# Patient Record
Sex: FEMALE | Race: WHITE | Employment: FULL TIME | URBAN - METROPOLITAN AREA
[De-identification: names, ages, dates, MRNs, and addresses within clinical notes are randomized per-mention and may not be internally consistent; named-entity substitution may affect disease eponyms.]

---

## 2020-02-21 ENCOUNTER — HOSPITAL ENCOUNTER (EMERGENCY)
Age: 60
Discharge: HOME | End: 2020-02-21
Attending: EMERGENCY MEDICINE | Admitting: EMERGENCY MEDICINE
Payer: OTHER MISCELLANEOUS

## 2020-02-21 VITALS
SYSTOLIC BLOOD PRESSURE: 143 MMHG | TEMPERATURE: 98.1 F | HEART RATE: 60 BPM | DIASTOLIC BLOOD PRESSURE: 85 MMHG | OXYGEN SATURATION: 97 % | RESPIRATION RATE: 18 BRPM

## 2020-02-21 DIAGNOSIS — S62.101A CLOSED FRACTURE OF RIGHT WRIST, INITIAL ENCOUNTER: Primary | ICD-10-CM

## 2020-02-21 PROCEDURE — 96372 THER/PROPH/DIAG INJ SC/IM: CPT

## 2020-02-21 PROCEDURE — 74011000250 HC RX REV CODE- 250: Performed by: PHYSICIAN ASSISTANT

## 2020-02-21 PROCEDURE — 96374 THER/PROPH/DIAG INJ IV PUSH: CPT

## 2020-02-21 PROCEDURE — 75810000303 HC CLSD TRMT  FRACTURE/DISLOCATION W/  ANES

## 2020-02-21 PROCEDURE — 99283 EMERGENCY DEPT VISIT LOW MDM: CPT

## 2020-02-21 PROCEDURE — 74011250636 HC RX REV CODE- 250/636: Performed by: PHYSICIAN ASSISTANT

## 2020-02-21 RX ORDER — HYDROCODONE BITARTRATE AND ACETAMINOPHEN 5; 325 MG/1; MG/1
1 TABLET ORAL
Qty: 12 TAB | Refills: 0 | Status: SHIPPED | OUTPATIENT
Start: 2020-02-21 | End: 2020-02-24

## 2020-02-21 RX ORDER — HYDROMORPHONE HYDROCHLORIDE 1 MG/ML
0.5 INJECTION, SOLUTION INTRAMUSCULAR; INTRAVENOUS; SUBCUTANEOUS ONCE
Status: COMPLETED | OUTPATIENT
Start: 2020-02-21 | End: 2020-02-21

## 2020-02-21 RX ORDER — BUPIVACAINE HYDROCHLORIDE 5 MG/ML
15 INJECTION, SOLUTION EPIDURAL; INTRACAUDAL ONCE
Status: COMPLETED | OUTPATIENT
Start: 2020-02-21 | End: 2020-02-21

## 2020-02-21 RX ADMIN — HYDROMORPHONE HYDROCHLORIDE 0.5 MG: 1 INJECTION, SOLUTION INTRAMUSCULAR; INTRAVENOUS; SUBCUTANEOUS at 19:42

## 2020-02-21 RX ADMIN — BUPIVACAINE HYDROCHLORIDE 75 MG: 5 INJECTION, SOLUTION EPIDURAL; INTRACAUDAL; PERINEURAL at 19:19

## 2020-02-21 NOTE — ED TRIAGE NOTES
From [facility] with a right wrist fracture. She says they spoke with an Orthopaedic surgeon. She was to come here to see him to have it \"set\". She has a splint and sling.

## 2020-02-21 NOTE — ED PROVIDER NOTES
63year old female, R handed, presenting for RIGHT wrist pain. Pt notes that earlier this afternoon she was coming around the corner at work and almost collided with someone, notes that she attempted to back up, lost her balance and fell, landing on her buttocks and right wrist.  Had acute onset of moderately severe pain in the right wrist, worse with movement or palpation. No head trauma or LOC. Pt was sent from [facility].      PMHx: HTN  Social: no tobacco, alcohol, drug use -rare glass of wine  Allergies: codeine, sulfa, MRI dye           Past Medical History:   Diagnosis Date    AR (allergic rhinitis) 8/23/2010    Arthritis     FINGERS    Chronic pain     LOWER BACK    Diverticulitis     Hemorrhoids     Migraine 8/23/2010    Nasal fracture     Sleep apnea     Unspecified sleep apnea     C-PAP       Past Surgical History:   Procedure Laterality Date    HX DILATION AND CURETTAGE      SEVERAL TIMES    HX GI      COLONOSCOPY, POLYPS    HX HERNIA REPAIR      RT. INGUINAL    HX ORTHOPAEDIC      right knee surgery    HX OTHER SURGICAL      CORTISONE INJECTIONS IN DANYELL. KNEES    [place] BIOPSY OF BREAST, NEEDLE CORE  1994    RT. BENIGN         Family History:   Problem Relation Age of Onset    Cancer Mother         BREAST     Cancer Father         MESOTHELIOMA       Social History     Socioeconomic History    Marital status:      Spouse name: Not on file    Number of children: Not on file    Years of education: Not on file    Highest education level: Not on file   Occupational History    Not on file   Social Needs    Financial resource strain: Not on file    Food insecurity:     Worry: Not on file     Inability: Not on file    Transportation needs:     Medical: Not on file     Non-medical: Not on file   Tobacco Use    Smoking status: Never Smoker    Smokeless tobacco: Never Used   Substance and Sexual Activity    Alcohol use:  Yes     Alcohol/week: 0.8 standard drinks     Types: 1 Glasses of wine per week    Drug use: No    Sexual activity: Not on file   Lifestyle    Physical activity:     Days per week: Not on file     Minutes per session: Not on file    Stress: Not on file   Relationships    Social connections:     Talks on phone: Not on file     Gets together: Not on file     Attends Mandaen service: Not on file     Active member of club or organization: Not on file     Attends meetings of clubs or organizations: Not on file     Relationship status: Not on file    Intimate partner violence:     Fear of current or ex partner: Not on file     Emotionally abused: Not on file     Physically abused: Not on file     Forced sexual activity: Not on file   Other Topics Concern    Not on file   Social History Narrative    Not on file         ALLERGIES: Codeine and Sulfa (sulfonamide antibiotics)    Review of Systems   Constitutional: Negative for fever. HENT: Negative for facial swelling. Respiratory: Negative for shortness of breath. Cardiovascular: Negative for chest pain. Gastrointestinal: Negative for vomiting. Musculoskeletal: Positive for arthralgias and joint swelling. Skin: Negative for wound. Neurological: Negative for syncope. All other systems reviewed and are negative. Vitals:    02/21/20 1721   BP: 143/85   Pulse: 60   Resp: 18   Temp: 98.1 °F (36.7 °C)   SpO2: 97%            Physical Exam  Vitals signs and nursing note reviewed. Constitutional:       Appearance: She is well-developed. Comments: Pleasant white female   HENT:      Head: Normocephalic. Eyes:      Conjunctiva/sclera: Conjunctivae normal.   Neck:      Musculoskeletal: Neck supple. Cardiovascular:      Rate and Rhythm: Normal rate. Pulmonary:      Effort: Pulmonary effort is normal. No respiratory distress. Musculoskeletal:      Comments: Right arm and volar splint, cap refill and sensation intact   Skin:     General: Skin is warm and dry.    Neurological:      Mental Status: She is alert and oriented to person, place, and time. MDM  Number of Diagnoses or Management Options  Diagnosis management comments:  52-year-old right-handed female presenting to the ED for right impacted distal radius fracture after mechanical fall earlier today, seen by Ortho for reduction in the ED, splinted, given orthopedic follow-up. Amount and/or Complexity of Data Reviewed  Tests in the radiology section of CPT®: ordered and reviewed  Discuss the patient with other providers: yes (Dr. Willie Rivera, ED attending.   Dr. Millie Abdalla and Raghav Rodriguez, on-call for orthopedics.)  Independent visualization of images, tracings, or specimens: yes (X-ray)           Procedures                                   Pt being seen by ortho in the ED>  МАРИЯ Brasher  7:44 PM

## 2020-02-22 NOTE — DISCHARGE INSTRUCTIONS
Patient Education   Patient Education     Elevate the wrist as possible and apply ice for 20 minutes at a time. Ibuprofen or Tylenol as needed for pain. Call Monday to make a follow-up appointment with Dr. David Graham. Broken Wrist: Care Instructions  Your Care Instructions    Your wrist can break, or fracture, during sports, a fall, or other accidents. The break may happen when your wrist is hit or is used to protect you in a fall. Fractures can range from a small, hairline crack, to a bone or bones broken into two or more pieces. Your treatment depends on how bad the break is. Your doctor may have put your wrist in a cast or splint. This will help keep your wrist stable until your follow-up appointment. It may take weeks or months for your wrist to heal. You can help it heal with care at home. You heal best when you take good care of yourself. Eat a variety of healthy foods, and don't smoke. Follow-up care is a key part of your treatment and safety. Be sure to make and go to all appointments, and call your doctor if you are having problems. It's also a good idea to know your test results and keep a list of the medicines you take. How can you care for yourself at home? · Put ice or a cold pack on your wrist for 10 to 20 minutes at a time. Try to do this every 1 to 2 hours for the next 3 days (when you are awake). Put a thin cloth between the ice and your cast or splint. Keep your cast or splint dry. · Follow the splint or cast care instructions your doctor gives you. If you have a splint, do not take it off unless your doctor tells you to. Be careful not to put the splint on too tight. · Be safe with medicines. Take pain medicines exactly as directed. ? If the doctor gave you a prescription medicine for pain, take it as prescribed. ? If you are not taking a prescription pain medicine, ask your doctor if you can take an over-the-counter medicine.   · Prop up your wrist on pillows when you sit or lie down in the first few days after the injury. Keep your wrist higher than the level of your heart. This will help reduce swelling. · Move your fingers often to reduce swelling and stiffness, but do not use that hand to grab or carry anything. · Follow instructions for exercises to keep your arm strong. When should you call for help? Call your doctor now or seek immediate medical care if:    · You have new or worse pain.     · Your hand or fingers are cool or pale or change color.     · Your cast or splint feels too tight.     · You have tingling, weakness, or numbness in your hand or fingers.    Watch closely for changes in your health, and be sure to contact your doctor if:    · You do not get better as expected.     · You have problems with your cast or splint. Where can you learn more? Go to http://turner-giuliana.info/. Enter [id number] in the search box to learn more about \"Broken Wrist: Care Instructions. \"  Current as of: June 26, 2019  Content Version: 12.2  © 1898-5903 [employer]. Care instructions adapted under license by [employer] (which disclaims liability or warranty for this information). If you have questions about a medical condition or this instruction, always ask your healthcare professional. [address] any warranty or liability for your use of this information. Caring for Your Splint: After Your Visit  Your Care Instructions     A splint protects a broken bone or other injury. If you have a removable splint, follow your doctor's instructions and only remove the splint if your doctor says you can. Most splints can be adjusted. Your doctor will show you how to do this and will tell you when you might need to adjust the splint. Many splints are premade. Your doctor may also make a splint from plaster or fiberglass. Some splints have a built-in air cushion. Air pads are inflated to hold the injured area in place.   Follow-up care is a key part of your treatment and safety. Be sure to make and go to all appointments, and call your doctor if you are having problems. It's also a good idea to know your test results and keep a list of the medicines you take. How can you care for yourself at home? General care  · Don't put any weight on a splint. If you have a walking boot, your doctor will tell you when you can put weight on it. · If the fingers or toes on the limb with the splint were not injured, wiggle them every now and then. This helps move the blood and fluids in the injured limb. · Prop up the injured arm or leg on a pillow when you ice it or anytime you sit or lie down during the next 3 days. Try to keep it above the level of your heart. This will help reduce swelling. · Put ice or cold packs on the hurt area for 10 to 20 minutes at a time. Try to do this every 1 to 2 hours for the next 3 days (when you are awake) or until the swelling goes down. Be careful not to get the splint wet. · Be safe with medicines. Read and follow all instructions on the label. ¨ If the doctor gave you a prescription medicine for pain, take it as prescribed. ¨ If you are not taking a prescription pain medicine, ask your doctor if you can take an over-the-counter medicine. · Keep up your muscle strength and tone as much as you can while protecting your injured limb or joint. Your doctor may want you to tense and relax the muscles protected by the splint. Check with your doctor or physical therapist for instructions. Splint and skin care  · If your splint is not to be removed, try blowing cool air from a hair dryer or fan into the splint to help relieve itching. Never stick items under your splint to scratch the skin. · Do not use oils or lotions near your splint. If the skin becomes red or sore around the edge of the splint, you may pad the edges with a soft material, such as moleskin, or use tape to cover the edges.   · If you're allowed to take your splint off, be sure your skin is dry before you put it back on. · Watch for pressure sores. These can develop over bony areas. Symptoms include a warm spot under the cast, pain, drainage, or an odor. Call your doctor if you think you have a pressure sore. · Watch for compartment syndrome. This happens when pressure builds up in a group of muscles, nerves, and blood vessels. It is an emergency. Symptoms include severe pain or tingling or numbness. Water and your splint  · Keep your splint dry. Moisture can collect under the splint and cause skin irritation and itching. If you have a wound or have had surgery, moisture under the splint can increase the risk of infection. · Cover your splint with at least two layers of plastic when you take a shower or bath, unless your doctor said you can take it off while bathing. · If you can take the splint off when you bathe, pat the area dry after bathing and put the splint back on.  · If your splint gets a little wet, you can dry it with a hair dryer. Use a \"cool\" setting. When should you call for help? Call your doctor now or seek immediate medical care if:  · You have increased or severe pain. · You feel a warm or painful spot under the splint. · You have problems with your splint. For example:  ¨ The skin under the splint burns or stings. ¨ The splint feels too tight. ¨ There is a lot of swelling near the splint. (Some swelling is normal.)  ¨ You have a new fever. ¨ There is drainage or a bad smell coming from the splint. · Your foot or hand is cool or pale or changes color. · You have trouble moving your fingers or toes. · You have symptoms of a blood clot in your arm or leg (called a deep vein thrombosis). These may include:  ¨ Pain in the arm, calf, back of the knee, thigh, or groin. ¨ Redness and swelling in the arm, leg, or groin.   Watch closely for changes in your health, and be sure to contact your doctor if:  · The splint is breaking apart or losing its shape. · You are not getting better as expected. Where can you learn more? Go to [employer]  Enter R142 in the search box to learn more about \"Caring for Your Splint: After Your Visit. \"   © 6371-2095 [employer]. Care instructions adapted under license by [facility] (which disclaims liability or warranty for this information). This care instruction is for use with your licensed healthcare professional. If you have questions about a medical condition or this instruction, always ask your healthcare professional. [address] any warranty or liability for your use of this information.   Content Version: 00.2.362904; Current as of: June 4, 2014

## 2020-02-22 NOTE — CONSULTS
ORTHO CONSULT NOTE    Date of Consultation:  2020  Referring Physician:  Gamal Howard  CC: right wrist pain    HPI:  Gabriel Putnam is a 63 y.o. right hand dom female who c/o right wrist pain/deformity s/p GLF at work today about 6 hours ago. She met a co-worker as they rounded a corner causing her to lose her balance and fall backwards onto right side. She denies open wound but reports some finger numbness. She had xrays and volar splint applied at [facility] and was sent here for reduction. She had previous knee surgery with Dr. Chandler Lewis and prefers [facility]. Past Medical History:   Diagnosis Date    AR (allergic rhinitis) 2010    Arthritis     FINGERS    Chronic pain     LOWER BACK    Diverticulitis     Hemorrhoids     Migraine 2010    Nasal fracture     Sleep apnea     Unspecified sleep apnea     C-PAP      Past Surgical History:   Procedure Laterality Date    HX DILATION AND CURETTAGE      SEVERAL TIMES    HX GI      COLONOSCOPY, POLYPS    HX HERNIA REPAIR      RT. INGUINAL    HX ORTHOPAEDIC      right knee surgery    HX OTHER SURGICAL      CORTISONE INJECTIONS IN DANYELL. KNEES    [place] BIOPSY OF BREAST, NEEDLE CORE      RT. BENIGN      Family History   Problem Relation Age of Onset    Cancer Mother         BREAST     Cancer Father         MESOTHELIOMA      Social History     Tobacco Use    Smoking status: Never Smoker    Smokeless tobacco: Never Used   Substance Use Topics    Alcohol use: Yes     Alcohol/week: 0.8 standard drinks     Types: 1 Glasses of wine per week        Allergies   Allergen Reactions    Codeine Rash and Itching    Sulfa (Sulfonamide Antibiotics) Rash and Itching        Review of Systems:  Per HPI. Objective:     Patient Vitals for the past 8 hrs:   BP Temp Pulse Resp SpO2   20 1721 143/85 98.1 °F (36.7 °C) 60 18 97 %     Temp (24hrs), Av.1 °F (36.7 °C), Min:98.1 °F (36.7 °C), Max:98.1 °F (36.7 °C)      EXAM:   NAD. Answers questions appropriately.  present. RUE in short arm volar splint. Upon removal, skin intact. Obvious edema in wrist.  Moves digits some. Subjective reduced sens but does have sens light touch. CR brisk. Right elbow, shoulder, clavicle no TTP. Moves LE spontaneously. Able to ambulate. Imaging Review:   Outside xrays reveal distal radius fracture with intra-articular extension with a dorsally displaced fragment. Impression:     Patient Active Problem List    Diagnosis Date Noted    Sciatica of left side 05/13/2013    AR (allergic rhinitis) 08/23/2010    Migraine 08/23/2010     Active Problems:    * No active hospital problems. *  Closed, intra-articular distal radius fracture right wrist.    Plan:   Reviewed xrays with Dr. Gisell Tan who recs removal of volar splint and application of molded sugartong using finger traps. Patient agrees. Single dose IM narcotic. Hematoma block using sterile technique, chlora-prep for skin, 10 cc 0.5% pl marcaine for local.  Patient hung in finger traps and padded, molded sugartong applied. Post splint application, moves digits better, improved sens to light touch all digits, CR brisk. Ice, elevate, sling. ER provider to [facility].  Call [address] Monday am for appointment. Patient understands this fracture pattern will likely require ORIF. Dr. Gisell Tan is aware and agrees with above plan.       МАРИЯ Vo  Orth[facility]uma Service  [address] [place] [address]

## 2021-10-21 ENCOUNTER — APPOINTMENT (OUTPATIENT)
Dept: CT IMAGING | Age: 61
End: 2021-10-21
Attending: EMERGENCY MEDICINE
Payer: COMMERCIAL

## 2021-10-21 ENCOUNTER — HOSPITAL ENCOUNTER (EMERGENCY)
Age: 61
Discharge: HOME | End: 2021-10-21
Attending: EMERGENCY MEDICINE
Payer: COMMERCIAL

## 2021-10-21 ENCOUNTER — APPOINTMENT (OUTPATIENT)
Dept: GENERAL RADIOLOGY | Age: 61
End: 2021-10-21
Attending: EMERGENCY MEDICINE
Payer: COMMERCIAL

## 2021-10-21 ENCOUNTER — HOSPITAL ENCOUNTER (EMERGENCY)
Age: 61
Discharge: STILL ADMITTED OR ADMINISTRATIVE | End: 2021-10-21
Attending: EMERGENCY MEDICINE

## 2021-10-21 VITALS
RESPIRATION RATE: 15 BRPM | OXYGEN SATURATION: 95 % | DIASTOLIC BLOOD PRESSURE: 91 MMHG | WEIGHT: 240 LBS | TEMPERATURE: 97.6 F | BODY MASS INDEX: 34.36 KG/M2 | SYSTOLIC BLOOD PRESSURE: 142 MMHG | HEIGHT: 70 IN | HEART RATE: 60 BPM

## 2021-10-21 DIAGNOSIS — R10.10 UPPER ABDOMINAL PAIN: Primary | ICD-10-CM

## 2021-10-21 LAB
ALBUMIN SERPL-MCNC: 3.7 G/DL (ref 3.5–5)
ALBUMIN/GLOB SERPL: 1 {RATIO} (ref 1.1–2.2)
ALP SERPL-CCNC: 125 U/L (ref 45–117)
ALT SERPL-CCNC: 90 U/L (ref 12–78)
ANION GAP SERPL CALC-SCNC: 8 MMOL/L (ref 5–15)
AST SERPL-CCNC: 152 U/L (ref 15–37)
BASOPHILS # BLD: 0 K/UL (ref 0–0.1)
BASOPHILS NFR BLD: 1 % (ref 0–1)
BILIRUB SERPL-MCNC: 0.7 MG/DL (ref 0.2–1)
BUN SERPL-MCNC: 17 MG/DL (ref 6–20)
BUN/CREAT SERPL: 18 (ref 12–20)
CALCIUM SERPL-MCNC: 9.4 MG/DL (ref 8.5–10.1)
CHLORIDE SERPL-SCNC: 105 MMOL/L (ref 97–108)
CO2 SERPL-SCNC: 31 MMOL/L (ref 21–32)
CREAT SERPL-MCNC: 0.93 MG/DL (ref 0.55–1.02)
DIFFERENTIAL METHOD BLD: NORMAL
EOSINOPHIL # BLD: 0 K/UL (ref 0–0.4)
EOSINOPHIL NFR BLD: 1 % (ref 0–7)
ERYTHROCYTE [DISTWIDTH] IN BLOOD BY AUTOMATED COUNT: 12.5 % (ref 11.5–14.5)
GLOBULIN SER CALC-MCNC: 3.8 G/DL (ref 2–4)
GLUCOSE SERPL-MCNC: 124 MG/DL (ref 65–100)
HCT VFR BLD AUTO: 45.4 % (ref 35–47)
HGB BLD-MCNC: 14.7 G/DL (ref 11.5–16)
IMM GRANULOCYTES # BLD AUTO: 0 K/UL (ref 0–0.04)
IMM GRANULOCYTES NFR BLD AUTO: 0 % (ref 0–0.5)
LIPASE SERPL-CCNC: 145 U/L (ref 73–393)
LYMPHOCYTES # BLD: 1.6 K/UL (ref 0.8–3.5)
LYMPHOCYTES NFR BLD: 21 % (ref 12–49)
MCH RBC QN AUTO: 29.7 PG (ref 26–34)
MCHC RBC AUTO-ENTMCNC: 32.4 G/DL (ref 30–36.5)
MCV RBC AUTO: 91.7 FL (ref 80–99)
MONOCYTES # BLD: 0.5 K/UL (ref 0–1)
MONOCYTES NFR BLD: 6 % (ref 5–13)
NEUTS SEG # BLD: 5.3 K/UL (ref 1.8–8)
NEUTS SEG NFR BLD: 71 % (ref 32–75)
NRBC # BLD: 0 K/UL (ref 0–0.01)
NRBC BLD-RTO: 0 PER 100 WBC
PLATELET # BLD AUTO: 169 K/UL (ref 150–400)
PMV BLD AUTO: 9.4 FL (ref 8.9–12.9)
POTASSIUM SERPL-SCNC: 3.6 MMOL/L (ref 3.5–5.1)
PROT SERPL-MCNC: 7.5 G/DL (ref 6.4–8.2)
RBC # BLD AUTO: 4.95 M/UL (ref 3.8–5.2)
SODIUM SERPL-SCNC: 144 MMOL/L (ref 136–145)
TROPONIN-HIGH SENSITIVITY: 7 NG/L (ref 0–51)
WBC # BLD AUTO: 7.5 K/UL (ref 3.6–11)

## 2021-10-21 PROCEDURE — 85025 COMPLETE CBC W/AUTO DIFF WBC: CPT

## 2021-10-21 PROCEDURE — 80053 COMPREHEN METABOLIC PANEL: CPT

## 2021-10-21 PROCEDURE — 74011250636 HC RX REV CODE- 250/636: Performed by: EMERGENCY MEDICINE

## 2021-10-21 PROCEDURE — 74177 CT ABD & PELVIS W/CONTRAST: CPT

## 2021-10-21 PROCEDURE — 93005 ELECTROCARDIOGRAM TRACING: CPT

## 2021-10-21 PROCEDURE — 96374 THER/PROPH/DIAG INJ IV PUSH: CPT

## 2021-10-21 PROCEDURE — 99285 EMERGENCY DEPT VISIT HI MDM: CPT

## 2021-10-21 PROCEDURE — 96375 TX/PRO/DX INJ NEW DRUG ADDON: CPT

## 2021-10-21 PROCEDURE — 71045 X-RAY EXAM CHEST 1 VIEW: CPT

## 2021-10-21 PROCEDURE — 36415 COLL VENOUS BLD VENIPUNCTURE: CPT

## 2021-10-21 PROCEDURE — 75810000275 HC EMERGENCY DEPT VISIT NO LEVEL OF CARE

## 2021-10-21 PROCEDURE — 83690 ASSAY OF LIPASE: CPT

## 2021-10-21 PROCEDURE — 84484 ASSAY OF TROPONIN QUANT: CPT

## 2021-10-21 PROCEDURE — 74011000636 HC RX REV CODE- 636: Performed by: EMERGENCY MEDICINE

## 2021-10-21 RX ORDER — HYDROMORPHONE HYDROCHLORIDE 1 MG/ML
1 INJECTION, SOLUTION INTRAMUSCULAR; INTRAVENOUS; SUBCUTANEOUS ONCE
Status: COMPLETED | OUTPATIENT
Start: 2021-10-21 | End: 2021-10-21

## 2021-10-21 RX ORDER — ONDANSETRON 2 MG/ML
4 INJECTION INTRAMUSCULAR; INTRAVENOUS
Status: COMPLETED | OUTPATIENT
Start: 2021-10-21 | End: 2021-10-21

## 2021-10-21 RX ORDER — SODIUM CHLORIDE 0.9 % (FLUSH) 0.9 %
5-40 SYRINGE (ML) INJECTION EVERY 8 HOURS
Status: DISCONTINUED | OUTPATIENT
Start: 2021-10-21 | End: 2021-10-21 | Stop reason: HOSPADM

## 2021-10-21 RX ORDER — DICYCLOMINE HYDROCHLORIDE 10 MG/5ML
20 SOLUTION ORAL
Qty: 473 ML | Refills: 0 | Status: SHIPPED | OUTPATIENT
Start: 2021-10-21

## 2021-10-21 RX ORDER — SODIUM CHLORIDE 0.9 % (FLUSH) 0.9 %
5-40 SYRINGE (ML) INJECTION AS NEEDED
Status: DISCONTINUED | OUTPATIENT
Start: 2021-10-21 | End: 2021-10-21 | Stop reason: HOSPADM

## 2021-10-21 RX ADMIN — ONDANSETRON 4 MG: 2 INJECTION INTRAMUSCULAR; INTRAVENOUS at 01:58

## 2021-10-21 RX ADMIN — Medication 10 ML: at 01:59

## 2021-10-21 RX ADMIN — IOPAMIDOL 100 ML: 755 INJECTION, SOLUTION INTRAVENOUS at 02:33

## 2021-10-21 RX ADMIN — HYDROMORPHONE HYDROCHLORIDE 1 MG: 1 INJECTION, SOLUTION INTRAMUSCULAR; INTRAVENOUS; SUBCUTANEOUS at 02:01

## 2021-10-21 NOTE — ED TRIAGE NOTES
Triage note:  Pt arrived with c/o upper abd pain radiating to back that started ~ 9 pm tonight.   Pt denies N/V.

## 2021-10-21 NOTE — ED NOTES
Discharge note: The patient was discharged home in stable condition, accompanied by family member. The patient is alert and oriented, is in no respiratory distress. The patient's diagnosis, condition and treatment were explained to patient by Dr Minoo Willett and reinforced by nurse. The patient and family party expressed understanding of discharge instructions, prescriptions, and plan of care. A discharge plan has been developed. A  was not involved in the process. Patient offered a wheelchair to ED lobby for discharge but declined at this time. Patient ambulatory to ED lobby to go home with family member.

## 2021-10-21 NOTE — ED PROVIDER NOTES
History of obesity, sleep apnea, allergies, arthritis, diverticulitis, migraines. She presents accompanied by her  with complaints of a 5-hour history of upper abdominal pain. It radiates to her back and between her shoulder blades. It has been constant. She cannot get comfortable. She tried baking soda and water and Gas-X without relief. She denies a history of similar pain in the past.  No chest pain, fever, cough, nausea, vomiting. She has been having normal bowel movements. No bright red blood per rectum or black stools. Past Medical History:   Diagnosis Date    AR (allergic rhinitis) 8/23/2010    Arthritis     FINGERS    Chronic pain     LOWER BACK    Diverticulitis     Hemorrhoids     Migraine 8/23/2010    Nasal fracture     Sleep apnea     Unspecified sleep apnea     C-PAP       Past Surgical History:   Procedure Laterality Date    HX DILATION AND CURETTAGE      SEVERAL TIMES    HX GI      COLONOSCOPY, POLYPS    HX HERNIA REPAIR      RT. INGUINAL    HX ORTHOPAEDIC      right knee surgery    HX OTHER SURGICAL      CORTISONE INJECTIONS IN DANYELL. KNEES    [place] BIOPSY OF BREAST, NEEDLE CORE  1994    RT. BENIGN         Family History:   Problem Relation Age of Onset    Cancer Mother         BREAST     Cancer Father         MESOTHELIOMA       Social History     Socioeconomic History    Marital status:      Spouse name: Not on file    Number of children: Not on file    Years of education: Not on file    Highest education level: Not on file   Occupational History    Not on file   Tobacco Use    Smoking status: Never Smoker    Smokeless tobacco: Never Used   Substance and Sexual Activity    Alcohol use:  Yes     Alcohol/week: 0.8 standard drinks     Types: 1 Glasses of wine per week    Drug use: No    Sexual activity: Not on file   Other Topics Concern    Not on file   Social History Narrative    Not on file     Social Determinants of Health     Financial Resource Strain:     Difficulty of Paying Living Expenses:    Food Insecurity:     Worried About Running Out of Food in the Last Year:     [address] in the Last Year:    Transportation Needs:     Lack of Transportation (Medical):  Lack of Transportation (Non-Medical):    Physical Activity:     Days of Exercise per Week:     Minutes of Exercise per Session:    Stress:     Feeling of Stress :    Social Connections:     Frequency of Communication with Friends and Family:     Frequency of Social Gatherings with Friends and Family:     Attends Confucianist Services:     Active Member of Clubs or Organizations:     Attends Club or Organization Meetings:     Marital Status:    Intimate Partner Violence:     Fear of Current or Ex-Partner:     Emotionally Abused:     Physically Abused:     Sexually Abused: ALLERGIES: Codeine and Sulfa (sulfonamide antibiotics)    Review of Systems   All other systems reviewed and are negative. Vitals:    10/21/21 0134   BP: (!) 185/75   Pulse: 68   Resp: 17   SpO2: 97%            Physical Exam  Vitals and nursing note reviewed. Constitutional:       Appearance: She is well-developed. Comments: Appears uncomfortable. HENT:      Head: Normocephalic and atraumatic. Eyes:      Conjunctiva/sclera: Conjunctivae normal.   Neck:      Trachea: No tracheal deviation. Cardiovascular:      Rate and Rhythm: Normal rate and regular rhythm. Heart sounds: Normal heart sounds. No murmur heard. No friction rub. No gallop. Pulmonary:      Effort: Pulmonary effort is normal.      Breath sounds: Normal breath sounds. Abdominal:      Palpations: Abdomen is soft. Comments: Upper abdominal tenderness. Musculoskeletal:         General: No deformity. Cervical back: Neck supple. Skin:     General: Skin is warm and dry. Neurological:      Mental Status: She is alert.       Comments: oriented          MDM       Procedures    EKG: Sinus bradycardia; rate of 58; PACs; nonspecific ST, T wave abnormalities. Mckenzie Julio MD  1:48 AM    Progress Note:  Results, treatment, and follow up plan have been discussed with patient/. .  Questions were answered. Her pain has resolved. Mckenzie Julio MD  3:04 AM    Assessment/plan: Upper abdominal pain -differential diagnosis includes ACS, AAA, pancreatitis, biliary colic, gastritis/GERD, and others. Reassuring appearance/exam with stable vital signs. CBC is unremarkable. CMP remarkable for mildly elevated LFTs. Lipase is normal.  CT is normal.  She got relief in the ED with Dilaudid. Home with Bentyl and PCP/GI/surgery follow-up. Return precautions discussed.   Mckenzie Julio MD  3:05 AM

## 2021-10-21 NOTE — ED PROVIDER NOTES
Arrived in error           No past medical history on file. No past surgical history on file. No family history on file. Social History     Socioeconomic History    Marital status:      Spouse name: Not on file    Number of children: Not on file    Years of education: Not on file    Highest education level: Not on file   Occupational History    Not on file   Tobacco Use    Smoking status: Not on file   Substance and Sexual Activity    Alcohol use: Not on file    Drug use: Not on file    Sexual activity: Not on file   Other Topics Concern    Not on file   Social History Narrative    Not on file     Social Determinants of Health     Financial Resource Strain:     Difficulty of Paying Living Expenses:    Food Insecurity:     Worried About Running Out of Food in the Last Year:     [address] in the Last Year:    Transportation Needs:     Lack of Transportation (Medical):  Lack of Transportation (Non-Medical):    Physical Activity:     Days of Exercise per Week:     Minutes of Exercise per Session:    Stress:     Feeling of Stress :    Social Connections:     Frequency of Communication with Friends and Family:     Frequency of Social Gatherings with Friends and Family:     Attends Latter day Services:     Active Member of Clubs or Organizations:     Attends Club or Organization Meetings:     Marital Status:    Intimate Partner Violence:     Fear of Current or Ex-Partner:     Emotionally Abused:     Physically Abused:     Sexually Abused: ALLERGIES: Patient has no allergy information on record. Review of Systems    There were no vitals filed for this visit.          Physical Exam     MDM       Procedures

## 2021-10-24 LAB
ATRIAL RATE: 58 BPM
CALCULATED P AXIS, ECG09: 8 DEGREES
CALCULATED R AXIS, ECG10: 0 DEGREES
CALCULATED T AXIS, ECG11: 77 DEGREES
DIAGNOSIS, 93000: NORMAL
P-R INTERVAL, ECG05: 152 MS
Q-T INTERVAL, ECG07: 406 MS
QRS DURATION, ECG06: 88 MS
QTC CALCULATION (BEZET), ECG08: 398 MS
VENTRICULAR RATE, ECG03: 58 BPM

## 2021-11-01 ENCOUNTER — TRANSCRIBE ORDER (OUTPATIENT)
Dept: REGISTRATION | Age: 61
End: 2021-11-01

## 2021-11-01 ENCOUNTER — OFFICE VISIT (OUTPATIENT)
Dept: CARDIOLOGY CLINIC | Age: 61
End: 2021-11-01
Payer: COMMERCIAL

## 2021-11-01 VITALS
RESPIRATION RATE: 16 BRPM | BODY MASS INDEX: 33.36 KG/M2 | DIASTOLIC BLOOD PRESSURE: 86 MMHG | HEIGHT: 70 IN | HEART RATE: 56 BPM | WEIGHT: 233 LBS | SYSTOLIC BLOOD PRESSURE: 134 MMHG | OXYGEN SATURATION: 99 %

## 2021-11-01 DIAGNOSIS — R07.2 PRECORDIAL CHEST PAIN: Primary | ICD-10-CM

## 2021-11-01 DIAGNOSIS — Z00.00 PREVENTATIVE HEALTH CARE: Primary | ICD-10-CM

## 2021-11-01 DIAGNOSIS — R94.31 ABNORMAL EKG: ICD-10-CM

## 2021-11-01 DIAGNOSIS — R79.89 ELEVATED LFTS: ICD-10-CM

## 2021-11-01 PROCEDURE — 99204 OFFICE O/P NEW MOD 45 MIN: CPT | Performed by: SPECIALIST

## 2021-11-01 RX ORDER — METFORMIN HYDROCHLORIDE 500 MG/1
TABLET, EXTENDED RELEASE ORAL
COMMUNITY

## 2021-11-01 RX ORDER — METFORMIN HYDROCHLORIDE 500 MG/1
TABLET, EXTENDED RELEASE ORAL
COMMUNITY
Start: 2021-08-26 | End: 2021-11-01

## 2021-11-01 NOTE — Clinical Note
11/1/2021    Patient: Rocío Noel   YOB: 1960   Date of Visit: 11/1/2021     Mary Alice Bowie MD  [facility] [id number]  Via Fax: [phone]    Dear Mary Alice Bowie MD,      Thank you for referring Ms. Lary Jauregui to [facility] for evaluation. My notes for this consultation are attached. If you have questions, please do not hesitate to call me. I look forward to following your patient along with you.       Sincerely,    Eric Dorsey MD

## 2021-11-01 NOTE — PROGRESS NOTES
Samir Valladares     1960       Irwin Huerta MD, [facility]  Date of Visit-2021   PCP is Negin Elise MD   [facility] [place]  [facility]  HPI:  Samir Valladares is a 62 y.o. female   New patient referral after ER visit with reported abnormal EKG and possible chest pain  ER visit 10/21/2021 abdominal pain, history of diverticulitis  ER labs hemoglobin 14.7 sodium 140 , K 3.6 , creatinine 0.93 high-sensitivity troponin was 7    Today. .. Pt states that her liver enzymes were irregular last week after being admitted to the ER for abdominal pain, and were later revealed to be normal. She notes that her pain radiated from her mid abdomen to her mid sternum and to her shoulders. She notes that she had some belches, but that the pain had not released. She reports that her mother had an MI in 2019. Pt states that she attempts to walk every but is limited form her past spinal and patellar surgeries. She notes that her grandmother had heart disease. Denies edema, syncope or shortness of breath at rest, has no tachycardia, palpitations or sense of arrhythmia. Social historynon-smoker occasional social 1 glass of wine 1 to 2 cups of caffeine lives with her  and 3 dogs has no children works in  for worked away enjoys crafts and sewing G3, P0  Family history --mother is 80 with a heart attack in 2009 and triple bypass, father  of mesothelioma 80, 2 brothers in good health, one sister is 61 breast cancer at 54, 1 sister at 64 with a breast cancer at 54 recovering from chemo radiation and heart rhythm   Review of systems positive for shortness of breath irregular heartbeat fast heartbeat and dizziness. Positive head and neck for frequent heart headaches migraines glasses blurry vision. Respiratory positive shortness of breath.  positive for urinary frequency.   GI positive for belching, constipation, diarrhea, change in bowel habits. GYN LMP 2000. Musculoskeletal positive for arthritis. Neurologic positive for numbness paralysis and anxiety. Total 12 system review of systems is scanned to chart  Operationsbreast reduction July 2021 wrist repair 2020, broken kneecaps breast biopsy and a femoral hernia surgery back surgery. ,  No prior heart catheterization blood transfusion or ulcers. Allergiessulfur and codeine give her hives MRI contrast gave her hives and itchy throat even with prednisone and Benadryl. Assessment/Plan:     1. CP, precordial  Started in abdomen and did radiate to chest and stayed there. Her troponin was normal but EKG was abnormal. She has some limitation in her exercise because of her back. We will obtain a stress echo. Results for orders placed or performed during the hospital encounter of 10/21/21   EKG, 12 LEAD, INITIAL   Result Value Ref Range    Ventricular Rate 58 BPM     Sinus bradycardia with premature atrial complexes  ST & T wave abnormality, consider anterior ischemia  Abnormal ECG  When compared with ECG of 10-MAY-2013 14:59,  premature atrial complexes are now present  Nonspecific T wave abnormality no longer evident in Inferior leads  T wave inversion now evident in Anterior leads  Confirmed by Amaya Clark MD. ([id number]) on 10/24/2021 11:56:02 PM       2. Abnormal EKG  Her EKG showed PAC's with ST changes. When compared to previous EKG there seemed to be some T-wave inversions. 3. Elevated LFTs  They have apparently varied , she has follow up with GI. Lab Results   Component Value Date/Time    ALT (SGPT) 90 (H) 10/21/2021 01:38 AM    AST (SGOT) 152 (H) 10/21/2021 01:38 AM    Alk. phosphatase 125 (H) 10/21/2021 01:38 AM    Bilirubin, total 0.7 10/21/2021 01:38 AM      Overall we recommended a stress now, and Coronary calcium score long term. Patient Instructions   You have been scheduled for a stress echocardiogram. Please arrive 15 minutes prior to your appointment.  Wear comfortable clothes and shoes. Please do not eat or drink anything other than water two hours prior to test. Please call [phone] to schedule your Coronary Calcium Score. We will call or send results via [employer]. Impression:   1. Precordial chest pain    2. Abnormal EKG    3. Elevated LFTs       Cardiac History:   No specialty comments available. Future Appointments   Date Time Provider Anupama Holley   11/23/2021 11:00 AM [facility] AMB   11/23/2021 11:00 AM VASCULAR, [facility] AMB        ROS-except as noted above. . A complete cardiac and respiratory are reviewed and negative except as above ; Resp-denies wheezing  or productive cough,. Const- No unusual weight loss or fever; Neuro-no recent seizure or CVA ; GI- No BRBPR, abdom pain, bloating ; - no  hematuria   see supplement sheet, initialed and to be scanned by staff  Past Medical History:   Diagnosis Date    AR (allergic rhinitis) 8/23/2010    Arthritis     FINGERS    Chronic pain     LOWER BACK    Diverticulitis     Hemorrhoids     Migraine 8/23/2010    Nasal fracture     Sleep apnea     Unspecified sleep apnea     C-PAP      Social Hx= reports that she has never smoked. She has never used smokeless tobacco. She reports current alcohol use of about 0.8 standard drinks of alcohol per week. She reports that she does not use drugs. Exam and Labs:  /86   Pulse (!) 56   Resp 16   Ht 5' 10\" (1.778 m)   Wt 233 lb (105.7 kg)   SpO2 99%   BMI 33.43 kg/m² Constitutional:  NAD, comfortable  Head: NC,AT. Eyes: No scleral icterus. Neck:  Neck supple. No JVD present. Throat: moist mucous membranes. Chest: Effort normal & normal respiratory excursion . Neurological: alert, conversant and oriented . Skin: Skin is not cold. No obvious systemic rash noted. Not diaphoretic. No erythema. Psychiatric:  Grossly normal mood and affect. Behavior appears normal. Extremities:  no clubbing or cyanosis. Abdomen: non distended    Lungs:breath sounds normal. No stridor. distress, wheezes or  Rales. Heart: normal rate, regular rhythm, normal S1, S2, no murmurs, rubs, clicks or gallops , PMI non displaced. Edema: Edema is none. No results found for: CHOL, CHOLX, CHLST, CHOLV, HDL, HDLP, LDL, LDLC, DLDLP, TGLX, TRIGL, TRIGP, CHHD, CHHDX  Lab Results   Component Value Date/Time    Sodium 144 10/21/2021 01:38 AM    Potassium 3.6 10/21/2021 01:38 AM    Chloride 105 10/21/2021 01:38 AM    CO2 31 10/21/2021 01:38 AM    Anion gap 8 10/21/2021 01:38 AM    Glucose 124 (H) 10/21/2021 01:38 AM    BUN 17 10/21/2021 01:38 AM    Creatinine 0.93 10/21/2021 01:38 AM    BUN/Creatinine ratio 18 10/21/2021 01:38 AM    GFR est AA >60 10/21/2021 01:38 AM    GFR est non-AA >60 10/21/2021 01:38 AM    Calcium 9.4 10/21/2021 01:38 AM      Wt Readings from Last 3 Encounters:   11/01/21 233 lb (105.7 kg)   10/21/21 240 lb (108.9 kg)   12/03/15 205 lb (93 kg)      BP Readings from Last 3 Encounters:   11/01/21 134/86   10/21/21 (!) 142/91   02/21/20 143/85      Current Outpatient Medications   Medication Sig    metFORMIN ER (GLUCOPHAGE XR) 500 mg tablet metformin  mg tablet,extended release 24 hr   TAKE 2 TABLETS BY MOUTH TWICE A DAY    dicyclomine (BENTYL) 10 mg/5 mL soln oral solution Take 10 mL by mouth four (4) times daily as needed for Pain.  multivitamin (ONE A DAY) tablet Take 1 Tablet by mouth daily.  fexofenadine (ALLEGRA) 180 mg tablet Take 180 mg by mouth daily. No current facility-administered medications for this visit. Impression see above.       Written by Marilou Josh, as dictated by Angelique Will MD.

## 2021-11-01 NOTE — PATIENT INSTRUCTIONS
You have been scheduled for a stress echocardiogram. Please arrive 15 minutes prior to your appointment. Wear comfortable clothes and shoes. Please do not eat or drink anything other than water two hours prior to test. Please call [phone] to schedule your Coronary Calcium Score. We will call or send results via [employer].

## 2021-11-04 ENCOUNTER — HOSPITAL ENCOUNTER (OUTPATIENT)
Dept: CT IMAGING | Age: 61
Discharge: HOME | End: 2021-11-04
Payer: SELF-PAY

## 2021-11-04 DIAGNOSIS — Z00.00 PREVENTATIVE HEALTH CARE: ICD-10-CM

## 2021-11-04 PROCEDURE — 75571 CT HRT W/O DYE W/CA TEST: CPT

## 2021-12-20 ENCOUNTER — ANCILLARY PROCEDURE (OUTPATIENT)
Dept: CARDIOLOGY CLINIC | Age: 61
End: 2021-12-20
Payer: COMMERCIAL

## 2021-12-20 ENCOUNTER — APPOINTMENT (OUTPATIENT)
Dept: CARDIOLOGY CLINIC | Age: 61
End: 2021-12-20

## 2021-12-20 VITALS
HEIGHT: 70 IN | BODY MASS INDEX: 33.36 KG/M2 | SYSTOLIC BLOOD PRESSURE: 130 MMHG | WEIGHT: 233 LBS | DIASTOLIC BLOOD PRESSURE: 86 MMHG

## 2021-12-20 DIAGNOSIS — R94.31 ABNORMAL EKG: ICD-10-CM

## 2021-12-20 DIAGNOSIS — R07.2 PRECORDIAL CHEST PAIN: ICD-10-CM

## 2021-12-20 LAB
STRESS ANGINA INDEX: 0
STRESS BASELINE DIAS BP: 86 MMHG
STRESS BASELINE HR: 73 BPM
STRESS BASELINE ST DEPRESSION: 0 MM
STRESS BASELINE SYS BP: 130 MMHG
STRESS ESTIMATED WORKLOAD: 7 METS
STRESS EXERCISE DUR MIN: NORMAL
STRESS O2 SAT PEAK: 97 %
STRESS O2 SAT REST: 100 %
STRESS PEAK DIAS BP: 88 MMHG
STRESS PEAK SYS BP: 154 MMHG
STRESS PERCENT HR ACHIEVED: 94 %
STRESS POST PEAK HR: 150 BPM
STRESS RATE PRESSURE PRODUCT: NORMAL BPM*MMHG
STRESS SR DUKE TREADMILL SCORE: 0
STRESS ST DEPRESSION: 0 MM
STRESS TARGET HR: 160 BPM

## 2021-12-20 PROCEDURE — 93325 DOPPLER ECHO COLOR FLOW MAPG: CPT | Performed by: SPECIALIST

## 2021-12-20 PROCEDURE — 93351 STRESS TTE COMPLETE: CPT | Performed by: SPECIALIST

## 2021-12-23 NOTE — PROGRESS NOTES
Good news! Your coronary calcium score was 0 which gives us a very good prediction over the next 10 years your risk of heart disease is very low. Your stress echocardiogram is also normal indicating that you are unlikely to have current heart blockages of significance. Continue with a heart healthy diet and exercise and continue your follow-up with your primary care physician. We will see you back as needed , if you have any other symptoms or questions please do not hesitate to call  No future appointments.

## 2023-04-19 ENCOUNTER — HOSPITAL ENCOUNTER (EMERGENCY)
Age: 63
Discharge: HOME | DRG: 313 | End: 2023-04-19
Attending: EMERGENCY MEDICINE
Payer: COMMERCIAL

## 2023-04-19 ENCOUNTER — HOSPITAL ENCOUNTER (EMERGENCY)
Dept: CT IMAGING | Age: 63
Discharge: HOME | DRG: 313 | End: 2023-04-19
Attending: EMERGENCY MEDICINE
Payer: COMMERCIAL

## 2023-04-19 ENCOUNTER — APPOINTMENT (OUTPATIENT)
Dept: GENERAL RADIOLOGY | Age: 63
DRG: 313 | End: 2023-04-19
Attending: EMERGENCY MEDICINE
Payer: COMMERCIAL

## 2023-04-19 ENCOUNTER — HOSPITAL ENCOUNTER (INPATIENT)
Age: 63
LOS: 3 days | Discharge: HOME | DRG: 313 | End: 2023-04-22
Attending: EMERGENCY MEDICINE | Admitting: INTERNAL MEDICINE
Payer: COMMERCIAL

## 2023-04-19 VITALS
DIASTOLIC BLOOD PRESSURE: 71 MMHG | TEMPERATURE: 97.6 F | WEIGHT: 219.8 LBS | HEART RATE: 53 BPM | BODY MASS INDEX: 31.47 KG/M2 | OXYGEN SATURATION: 95 % | HEIGHT: 70 IN | SYSTOLIC BLOOD PRESSURE: 112 MMHG | RESPIRATION RATE: 18 BRPM

## 2023-04-19 DIAGNOSIS — K21.9 GASTROESOPHAGEAL REFLUX DISEASE, UNSPECIFIED WHETHER ESOPHAGITIS PRESENT: Primary | ICD-10-CM

## 2023-04-19 DIAGNOSIS — R10.13 ABDOMINAL PAIN, EPIGASTRIC: ICD-10-CM

## 2023-04-19 DIAGNOSIS — R74.01 TRANSAMINITIS: Primary | ICD-10-CM

## 2023-04-19 DIAGNOSIS — R07.2 PRECORDIAL PAIN: ICD-10-CM

## 2023-04-19 DIAGNOSIS — K83.1 BILIARY OBSTRUCTION: ICD-10-CM

## 2023-04-19 DIAGNOSIS — R07.89 ATYPICAL CHEST PAIN: ICD-10-CM

## 2023-04-19 LAB
ALBUMIN SERPL-MCNC: 3.3 G/DL (ref 3.5–5)
ALBUMIN SERPL-MCNC: 3.5 G/DL (ref 3.5–5)
ALBUMIN/GLOB SERPL: 1.1 (ref 1.1–2.2)
ALBUMIN/GLOB SERPL: 1.1 (ref 1.1–2.2)
ALP SERPL-CCNC: 157 U/L (ref 45–117)
ALP SERPL-CCNC: 99 U/L (ref 45–117)
ALT SERPL-CCNC: 545 U/L (ref 12–78)
ALT SERPL-CCNC: 75 U/L (ref 12–78)
ANION GAP SERPL CALC-SCNC: 10 MMOL/L (ref 5–15)
ANION GAP SERPL CALC-SCNC: 11 MMOL/L (ref 5–15)
APAP SERPL-MCNC: <2 UG/ML (ref 10–30)
AST SERPL-CCNC: 683 U/L (ref 15–37)
AST SERPL-CCNC: 93 U/L (ref 15–37)
BASOPHILS # BLD: 0 K/UL (ref 0–0.1)
BASOPHILS # BLD: 0 K/UL (ref 0–0.1)
BASOPHILS NFR BLD: 0 % (ref 0–1)
BASOPHILS NFR BLD: 1 % (ref 0–1)
BILIRUB SERPL-MCNC: 0.6 MG/DL (ref 0.2–1)
BILIRUB SERPL-MCNC: 1.7 MG/DL (ref 0.2–1)
BUN SERPL-MCNC: 11 MG/DL (ref 6–20)
BUN SERPL-MCNC: 16 MG/DL (ref 6–20)
BUN/CREAT SERPL: 14 (ref 12–20)
BUN/CREAT SERPL: 17 (ref 12–20)
CALCIUM SERPL-MCNC: 9 MG/DL (ref 8.5–10.1)
CALCIUM SERPL-MCNC: 9 MG/DL (ref 8.5–10.1)
CHLORIDE SERPL-SCNC: 102 MMOL/L (ref 97–108)
CHLORIDE SERPL-SCNC: 106 MMOL/L (ref 97–108)
CO2 SERPL-SCNC: 25 MMOL/L (ref 21–32)
CO2 SERPL-SCNC: 26 MMOL/L (ref 21–32)
CREAT SERPL-MCNC: 0.81 MG/DL (ref 0.55–1.02)
CREAT SERPL-MCNC: 0.96 MG/DL (ref 0.55–1.02)
DIFFERENTIAL METHOD BLD: NORMAL
DIFFERENTIAL METHOD BLD: NORMAL
EOSINOPHIL # BLD: 0 K/UL (ref 0–0.4)
EOSINOPHIL # BLD: 0.1 K/UL (ref 0–0.4)
EOSINOPHIL NFR BLD: 1 % (ref 0–7)
EOSINOPHIL NFR BLD: 2 % (ref 0–7)
ERYTHROCYTE [DISTWIDTH] IN BLOOD BY AUTOMATED COUNT: 11.8 % (ref 11.5–14.5)
ERYTHROCYTE [DISTWIDTH] IN BLOOD BY AUTOMATED COUNT: 11.9 % (ref 11.5–14.5)
ETHANOL SERPL-MCNC: <10 MG/DL
GLOBULIN SER CALC-MCNC: 3 G/DL (ref 2–4)
GLOBULIN SER CALC-MCNC: 3.3 G/DL (ref 2–4)
GLUCOSE SERPL-MCNC: 117 MG/DL (ref 65–100)
GLUCOSE SERPL-MCNC: 128 MG/DL (ref 65–100)
HCT VFR BLD AUTO: 41 % (ref 35–47)
HCT VFR BLD AUTO: 41.8 % (ref 35–47)
HGB BLD-MCNC: 13.9 G/DL (ref 11.5–16)
HGB BLD-MCNC: 14 G/DL (ref 11.5–16)
IMM GRANULOCYTES # BLD AUTO: 0 K/UL (ref 0–0.04)
IMM GRANULOCYTES # BLD AUTO: 0 K/UL (ref 0–0.04)
IMM GRANULOCYTES NFR BLD AUTO: 0 % (ref 0–0.5)
IMM GRANULOCYTES NFR BLD AUTO: 0 % (ref 0–0.5)
LIPASE SERPL-CCNC: 171 U/L (ref 73–393)
LYMPHOCYTES # BLD: 1 K/UL (ref 0.8–3.5)
LYMPHOCYTES # BLD: 1.9 K/UL (ref 0.8–3.5)
LYMPHOCYTES NFR BLD: 18 % (ref 12–49)
LYMPHOCYTES NFR BLD: 45 % (ref 12–49)
MCH RBC QN AUTO: 30.8 PG (ref 26–34)
MCH RBC QN AUTO: 31 PG (ref 26–34)
MCHC RBC AUTO-ENTMCNC: 33.5 G/DL (ref 30–36.5)
MCHC RBC AUTO-ENTMCNC: 33.9 G/DL (ref 30–36.5)
MCV RBC AUTO: 91.5 FL (ref 80–99)
MCV RBC AUTO: 92.1 FL (ref 80–99)
MONOCYTES # BLD: 0.2 K/UL (ref 0–1)
MONOCYTES # BLD: 0.3 K/UL (ref 0–1)
MONOCYTES NFR BLD: 6 % (ref 5–13)
MONOCYTES NFR BLD: 6 % (ref 5–13)
NEUTS SEG # BLD: 2.1 K/UL (ref 1.8–8)
NEUTS SEG # BLD: 4.2 K/UL (ref 1.8–8)
NEUTS SEG NFR BLD: 46 % (ref 32–75)
NEUTS SEG NFR BLD: 75 % (ref 32–75)
NRBC # BLD: 0 K/UL (ref 0–0.01)
NRBC # BLD: 0 K/UL (ref 0–0.01)
NRBC BLD-RTO: 0 PER 100 WBC
NRBC BLD-RTO: 0 PER 100 WBC
PLATELET # BLD AUTO: 151 K/UL (ref 150–400)
PLATELET # BLD AUTO: 168 K/UL (ref 150–400)
PMV BLD AUTO: 9.5 FL (ref 8.9–12.9)
PMV BLD AUTO: 9.7 FL (ref 8.9–12.9)
POTASSIUM SERPL-SCNC: 3.7 MMOL/L (ref 3.5–5.1)
POTASSIUM SERPL-SCNC: 3.9 MMOL/L (ref 3.5–5.1)
PROT SERPL-MCNC: 6.3 G/DL (ref 6.4–8.2)
PROT SERPL-MCNC: 6.8 G/DL (ref 6.4–8.2)
RBC # BLD AUTO: 4.48 M/UL (ref 3.8–5.2)
RBC # BLD AUTO: 4.54 M/UL (ref 3.8–5.2)
SODIUM SERPL-SCNC: 138 MMOL/L (ref 136–145)
SODIUM SERPL-SCNC: 142 MMOL/L (ref 136–145)
TROPONIN I SERPL HS-MCNC: 4 NG/L (ref 0–51)
TROPONIN I SERPL HS-MCNC: 5 NG/L (ref 0–51)
WBC # BLD AUTO: 4.3 K/UL (ref 3.6–11)
WBC # BLD AUTO: 5.5 K/UL (ref 3.6–11)

## 2023-04-19 PROCEDURE — 83690 ASSAY OF LIPASE: CPT

## 2023-04-19 PROCEDURE — 96374 THER/PROPH/DIAG INJ IV PUSH: CPT

## 2023-04-19 PROCEDURE — 74011250636 HC RX REV CODE- 250/636: Performed by: EMERGENCY MEDICINE

## 2023-04-19 PROCEDURE — 65270000046 HC RM TELEMETRY

## 2023-04-19 PROCEDURE — 93005 ELECTROCARDIOGRAM TRACING: CPT

## 2023-04-19 PROCEDURE — 82077 ASSAY SPEC XCP UR&BREATH IA: CPT

## 2023-04-19 PROCEDURE — 74011000636 HC RX REV CODE- 636: Performed by: EMERGENCY MEDICINE

## 2023-04-19 PROCEDURE — 80053 COMPREHEN METABOLIC PANEL: CPT

## 2023-04-19 PROCEDURE — 99285 EMERGENCY DEPT VISIT HI MDM: CPT

## 2023-04-19 PROCEDURE — 74177 CT ABD & PELVIS W/CONTRAST: CPT

## 2023-04-19 PROCEDURE — 80143 DRUG ASSAY ACETAMINOPHEN: CPT

## 2023-04-19 PROCEDURE — 84484 ASSAY OF TROPONIN QUANT: CPT

## 2023-04-19 PROCEDURE — 96375 TX/PRO/DX INJ NEW DRUG ADDON: CPT

## 2023-04-19 PROCEDURE — 36415 COLL VENOUS BLD VENIPUNCTURE: CPT

## 2023-04-19 PROCEDURE — 71045 X-RAY EXAM CHEST 1 VIEW: CPT

## 2023-04-19 PROCEDURE — 85025 COMPLETE CBC W/AUTO DIFF WBC: CPT

## 2023-04-19 RX ORDER — MORPHINE SULFATE 4 MG/ML
4 INJECTION INTRAVENOUS ONCE
Status: COMPLETED | OUTPATIENT
Start: 2023-04-19 | End: 2023-04-19

## 2023-04-19 RX ORDER — FLUTICASONE FUROATE 27.5 UG/1
SPRAY, METERED NASAL
COMMUNITY
Start: 2015-11-01

## 2023-04-19 RX ORDER — RALOXIFENE HYDROCHLORIDE 60 MG/1
TABLET, FILM COATED ORAL
COMMUNITY
Start: 2022-10-26

## 2023-04-19 RX ORDER — SUMATRIPTAN 100 MG/1
TABLET, FILM COATED ORAL
COMMUNITY
Start: 2000-11-01

## 2023-04-19 RX ORDER — ONDANSETRON 2 MG/ML
4 INJECTION INTRAMUSCULAR; INTRAVENOUS ONCE
Status: COMPLETED | OUTPATIENT
Start: 2023-04-19 | End: 2023-04-19

## 2023-04-19 RX ORDER — FENTANYL CITRATE 50 UG/ML
50 INJECTION, SOLUTION INTRAMUSCULAR; INTRAVENOUS
Status: DISCONTINUED | OUTPATIENT
Start: 2023-04-19 | End: 2023-04-20 | Stop reason: ALTCHOICE

## 2023-04-19 RX ORDER — ONDANSETRON 2 MG/ML
4 INJECTION INTRAMUSCULAR; INTRAVENOUS
Status: DISCONTINUED | OUTPATIENT
Start: 2023-04-19 | End: 2023-04-20

## 2023-04-19 RX ORDER — FAMOTIDINE 20 MG/1
20 TABLET, FILM COATED ORAL
Qty: 20 TABLET | Refills: 0 | Status: SHIPPED | OUTPATIENT
Start: 2023-04-19

## 2023-04-19 RX ORDER — LISINOPRIL 10 MG/1
10 TABLET ORAL DAILY
COMMUNITY
Start: 2023-02-04

## 2023-04-19 RX ORDER — KETOROLAC TROMETHAMINE 30 MG/ML
15 INJECTION, SOLUTION INTRAMUSCULAR; INTRAVENOUS
Status: COMPLETED | OUTPATIENT
Start: 2023-04-19 | End: 2023-04-19

## 2023-04-19 RX ADMIN — MORPHINE SULFATE 4 MG: 4 INJECTION, SOLUTION INTRAMUSCULAR; INTRAVENOUS at 06:40

## 2023-04-19 RX ADMIN — ONDANSETRON 4 MG: 2 INJECTION INTRAMUSCULAR; INTRAVENOUS at 18:01

## 2023-04-19 RX ADMIN — IOHEXOL 100 ML: 350 INJECTION, SOLUTION INTRAVENOUS at 18:35

## 2023-04-19 RX ADMIN — KETOROLAC TROMETHAMINE 15 MG: 30 INJECTION, SOLUTION INTRAMUSCULAR; INTRAVENOUS at 18:01

## 2023-04-19 RX ADMIN — SODIUM CHLORIDE, POTASSIUM CHLORIDE, SODIUM LACTATE AND CALCIUM CHLORIDE 1000 ML: 600; 310; 30; 20 INJECTION, SOLUTION INTRAVENOUS at 17:58

## 2023-04-19 RX ADMIN — ONDANSETRON 4 MG: 2 INJECTION INTRAMUSCULAR; INTRAVENOUS at 06:40

## 2023-04-19 RX ADMIN — FENTANYL CITRATE 50 MCG: 0.05 INJECTION, SOLUTION INTRAMUSCULAR; INTRAVENOUS at 20:25

## 2023-04-19 NOTE — ED PROVIDER NOTES
The history is provided by the patient. Abdominal Pain   This is a recurrent problem. The current episode started 6 to 12 hours ago. The problem occurs constantly. The problem has been gradually worsening. The pain is associated with an unknown factor. Pain location: was initially in chest and back, now in epigastric and LUQ abdomen. The pain is moderate. Pertinent negatives include no fever and no vomiting. Nothing worsens the pain. Relieved by: morphine temporarily but worsened when it wore off. The patient's surgical history includes cholecystectomy. Past Medical History:   Diagnosis Date    AR (allergic rhinitis) 08/23/2010    Arthritis     FINGERS    Chronic pain     LOWER BACK    Diverticulitis     Hemorrhoids     Hyperinsulinism     Migraine 08/23/2010    Nasal fracture     Obesity     Obstructive sleep apnea     Prediabetes     Sleep apnea     Unspecified sleep apnea     C-PAP       Past Surgical History:   Procedure Laterality Date    HX CHOLECYSTECTOMY  2022    HX DILATION AND CURETTAGE      SEVERAL TIMES    HX GI      COLONOSCOPY, POLYPS    HX HERNIA REPAIR      RT. INGUINAL    HX ORTHOPAEDIC      right knee surgery    HX OTHER SURGICAL      CORTISONE INJECTIONS IN DANYELL. KNEES    OK BX BREAST NEEDLE CORE W/O IMAGING GUIDANCE SPX  1994    RT. BENIGN         Family History:   Problem Relation Age of Onset    Cancer Mother         BREAST     Cancer Father         MESOTHELIOMA       Social History     Socioeconomic History    Marital status:      Spouse name: Not on file    Number of children: Not on file    Years of education: Not on file    Highest education level: Not on file   Occupational History    Not on file   Tobacco Use    Smoking status: Never    Smokeless tobacco: Never   Substance and Sexual Activity    Alcohol use:  Yes     Alcohol/week: 0.8 standard drinks     Types: 1 Glasses of wine per week    Drug use: No    Sexual activity: Not on file   Other Topics Concern    Not on file Social History Narrative    Not on file     Social Determinants of Health     Financial Resource Strain: Not on file   Food Insecurity: Not on file   Transportation Needs: Not on file   Physical Activity: Not on file   Stress: Not on file   Social Connections: Not on file   Intimate Partner Violence: Not on file   Housing Stability: Not on file         ALLERGIES: Codeine, Other medication, and Sulfa (sulfonamide antibiotics)    Review of Systems   Constitutional:  Negative for fever. Gastrointestinal:  Positive for abdominal pain. Negative for vomiting. Vitals:    04/19/23 1749   Pulse: (!) 55   Resp: 12   Temp: 97.9 °F (36.6 °C)   SpO2: 96%   Weight: 96.7 kg (213 lb 3 oz)   Height: 5' 10\" (1.778 m)            Physical Exam  Vitals and nursing note reviewed. Constitutional:       General: She is not in acute distress. Appearance: She is well-developed. Comments: Uncomfortable due to pain stating \"please give me drugs\" when approached   HENT:      Head: Normocephalic and atraumatic. Eyes:      Conjunctiva/sclera: Conjunctivae normal.   Cardiovascular:      Rate and Rhythm: Regular rhythm. Bradycardia present. Pulmonary:      Effort: Pulmonary effort is normal. No respiratory distress. Abdominal:      General: There is no distension. Palpations: Abdomen is soft. Tenderness: There is abdominal tenderness in the epigastric area and left upper quadrant. There is no guarding or rebound. Negative signs include Aguilar's sign and McBurney's sign. Musculoskeletal:         General: No deformity. Normal range of motion. Cervical back: Neck supple. Skin:     General: Skin is warm and dry. Neurological:      Mental Status: She is alert. Cranial Nerves: No cranial nerve deficit. Psychiatric:         Behavior: Behavior normal.        Medical Decision Making  65 y.o. female presents with chest pain this morning that radiated into her back and was treated for possible GERD.  At the time of evaluation AST was slightly elevated and ALT was normal. She followed up with PCP today and had blood drawn which showed elevating transaminitis and pain has since moved to epigastric area concerning for intrahepatic biliary obstruction versus pancreatitis versus acute hepatitis. Lipase not elevated so does not appear to be pancreatitis. Sudden elevation in bili, AST, ALT and Alk Phos are suggestive of a biliary obstruction and patient will need to be admitted for likely MRCP or ERCP. CT shows surgically absent gallbladder with 12 mm CBD on my measurement pending radiology read. Problems Addressed:  Abdominal pain, epigastric: acute illness or injury  Biliary obstruction: acute illness or injury  Transaminitis: acute illness or injury    Amount and/or Complexity of Data Reviewed  Labs: ordered. Decision-making details documented in ED Course. Radiology: ordered and independent interpretation performed. Decision-making details documented in ED Course. Risk  Prescription drug management. Parenteral controlled substances. Decision regarding hospitalization. Procedures      Perfect Serve Consult for Admission  7:15 PM    ED Room Number: SER06/06  Patient Name and age: Ryan Holder 65 y.o.  female  Working Diagnosis:   1. Transaminitis    2. Abdominal pain, epigastric    3. Biliary obstruction        COVID-19 Suspicion:  no  Sepsis present:  no  Reassessment needed: no  Code Status:  Full Code  Readmission: no  Isolation Requirements:  no  Recommended Level of Care:  med/surg  Department: [place] [facility] - [phone]  Admitting Provider: Juan Ramon Celestin    Other:  65 y.o. female presents with worsening epigastric pain and acutely elevating bili, liver enzymes from this morning. Suspect she has a biliary obstruction. Imaging final read is pending.

## 2023-04-19 NOTE — ED NOTES
Patient expresses frustration with triage process and is resistant in answering questions and medical history.  stated firmly \" can you stop! \"

## 2023-04-19 NOTE — ED PROVIDER NOTES
Patient is a 71-year-old female with history of allergic rhinitis, chronic low back pain, diverticulitis, hemorrhoids, hyperinsulinism, migraines, CHALO, prediabetes who presents with midsternal chest pain. Patient reports that she woke up this morning, and began experiencing pain along with nausea and diaphoresis. Patient reports that she has had upper abdominal pain for which she has been seen in the ED in the past.  At that time she was diagnosed with gallbladder disease but she does not have a gallbladder anymore. Patient denies pain being related to exertion. Patient says that she has had a stress test in the last 2 years that was negative. Reports he has strong family history of coronary artery disease. Does follow with a cardiologist, but does not member the name at this time. No recent illness, travel, sick contacts. Past Medical History:   Diagnosis Date    AR (allergic rhinitis) 08/23/2010    Arthritis     FINGERS    Chronic pain     LOWER BACK    Diverticulitis     Hemorrhoids     Hyperinsulinism     Migraine 08/23/2010    Nasal fracture     Obesity     Obstructive sleep apnea     Prediabetes     Sleep apnea     Unspecified sleep apnea     C-PAP       Past Surgical History:   Procedure Laterality Date    HX CHOLECYSTECTOMY  2022    HX DILATION AND CURETTAGE      SEVERAL TIMES    HX GI      COLONOSCOPY, POLYPS    HX HERNIA REPAIR      RT. INGUINAL    HX ORTHOPAEDIC      right knee surgery    HX OTHER SURGICAL      CORTISONE INJECTIONS IN DANYELL. KNEES    ME BX BREAST NEEDLE CORE W/O IMAGING GUIDANCE SPX  1994    RT.  BENIGN         Family History:   Problem Relation Age of Onset    Cancer Mother         BREAST     Cancer Father         MESOTHELIOMA       Social History     Socioeconomic History    Marital status:      Spouse name: Not on file    Number of children: Not on file    Years of education: Not on file    Highest education level: Not on file   Occupational History    Not on file Tobacco Use    Smoking status: Never    Smokeless tobacco: Never   Substance and Sexual Activity    Alcohol use: Yes     Alcohol/week: 0.8 standard drinks     Types: 1 Glasses of wine per week    Drug use: No    Sexual activity: Not on file   Other Topics Concern    Not on file   Social History Narrative    Not on file     Social Determinants of Health     Financial Resource Strain: Not on file   Food Insecurity: Not on file   Transportation Needs: Not on file   Physical Activity: Not on file   Stress: Not on file   Social Connections: Not on file   Intimate Partner Violence: Not on file   Housing Stability: Not on file         ALLERGIES: Codeine, Other medication, and Sulfa (sulfonamide antibiotics)    Review of Systems   Constitutional:  Negative for chills and fever. HENT:  Negative for drooling and nosebleeds. Eyes:  Negative for pain and itching. Respiratory:  Negative for choking and stridor. Cardiovascular:  Positive for chest pain. Negative for leg swelling. Gastrointestinal:  Negative for abdominal distention and rectal pain. Endocrine: Negative for heat intolerance and polyphagia. Genitourinary:  Negative for enuresis and genital sores. Musculoskeletal:  Negative for arthralgias and joint swelling. Skin:  Negative for color change. Allergic/Immunologic: Negative for immunocompromised state. Neurological:  Negative for tremors and speech difficulty. Hematological:  Negative for adenopathy. Psychiatric/Behavioral:  Negative for dysphoric mood and sleep disturbance. Vitals:    04/19/23 0545 04/19/23 0549 04/19/23 0605 04/19/23 0704   BP:   116/74 108/71   Pulse: (!) 56 (!) 49 (!) 49 (!) 53   Resp: 16 11 15 11   Temp:       SpO2: 98% 98% 94% 96%   Weight:       Height:                Physical Exam  Vitals and nursing note reviewed. Constitutional:       General: She is not in acute distress. Appearance: She is well-developed.  She is not ill-appearing, toxic-appearing or diaphoretic. HENT:      Head: Normocephalic. Nose: Nose normal.   Eyes:      Conjunctiva/sclera: Conjunctivae normal.   Cardiovascular:      Rate and Rhythm: Normal rate and regular rhythm. Pulmonary:      Effort: Pulmonary effort is normal. No respiratory distress. Breath sounds: Normal breath sounds. Abdominal:      General: There is no distension. Palpations: Abdomen is soft. Musculoskeletal:         General: No deformity. Normal range of motion. Cervical back: Normal range of motion and neck supple. Skin:     General: Skin is warm and dry. Neurological:      Mental Status: She is alert. Coordination: Coordination normal.   Psychiatric:         Behavior: Behavior normal.        Medical Decision Making  Pt reporting midsternal CP not similar to when she was in the ED with epigastric pain - gb pathology at the time and pt has had cholecystectomy since then. Pt appears uncomfortable on arrival. No abdominal TTP on exam. No concern for aortic pathology today, pts VS are stable. No acute ischemia on EKG. Pending labs, signed out to incoming team for further management. Amount and/or Complexity of Data Reviewed  Independent Historian: spouse  External Data Reviewed: notes. Labs: ordered. Decision-making details documented in ED Course. Radiology: ordered and independent interpretation performed. Decision-making details documented in ED Course. ECG/medicine tests: ordered and independent interpretation performed. Decision-making details documented in ED Course. Risk  Prescription drug management. ED Course as of 04/19/23 0718   Wed Apr 19, 2023   4890 ED EKG interpretation:  Rhythm: sinus bradycardia; and regular . Rate (approx.): 54; Axis: normal; ST/T wave: normal; No evidence of acute coronary ischemia.      [AL]      ED Course User Index  [AL] Nehal Sethi MD       Procedures

## 2023-04-19 NOTE — ED NOTES
Discharge instructions reviewed with pt and copy given along with RX by this RN. Patient educated on follow up with PCP. Pt ambulatory from ED with spouse in no sign of distress or discomfort.

## 2023-04-19 NOTE — ED TRIAGE NOTES
Pt reports she was seen here this am and was d/c with acid reflux after being evaluated for chest pain. Pt reports she was seen at her PCP today and had additional lab work drawn and her liver enzymes were elevated and her pain has increased quite a lot. Pt reports her PCP would like her to have a CT scan of her abdomen.

## 2023-04-19 NOTE — ED TRIAGE NOTES
Patient reports sudden onset of substernal chest pain radiating to in between shoulder blades - patient does not have gallbladder. Patient endorses nausea. Patient is belching during triage.

## 2023-04-19 NOTE — ED NOTES
Care assumed from Dr. Sawyer Reyna at 7 AM please see her note for full H&P.  52-year-old female presents with precordial chest pain diaphoresis and nausea. Labs returned very reassuring, initial troponin negative CXR negative. Repeat troponin pending at time of signout and returned negative. Patient was reassessed and she denies any further pain. Given history suspect GERD or other noncardiac etiology for symptoms. Low suspicion for ACS, dissection, PE, or other intrathoracic emergency at this time. She was recommended Pepcid as needed and PCP follow-up for further evaluation as needed. Return precautions were given for worsening or concerns. Please note that this dictation was completed with [employer], the computer voice recognition software. Quite often unanticipated grammatical, syntax, homophones, and other interpretive errors are inadvertently transcribed by the computer software. Please disregard these errors. Please excuse any errors that have escaped final proofreading.

## 2023-04-20 ENCOUNTER — APPOINTMENT (OUTPATIENT)
Dept: MRI IMAGING | Age: 63
DRG: 313 | End: 2023-04-20
Attending: INTERNAL MEDICINE
Payer: COMMERCIAL

## 2023-04-20 PROBLEM — R07.9 CHEST PAIN: Status: ACTIVE | Noted: 2023-04-20

## 2023-04-20 LAB
ALBUMIN SERPL-MCNC: 3 G/DL (ref 3.5–5)
ALBUMIN/GLOB SERPL: 0.9 (ref 1.1–2.2)
ALP SERPL-CCNC: 165 U/L (ref 45–117)
ALT SERPL-CCNC: 581 U/L (ref 12–78)
AMMONIA PLAS-SCNC: 36 UMOL/L
AMPHET UR QL SCN: NEGATIVE
ANION GAP SERPL CALC-SCNC: 4 MMOL/L (ref 5–15)
APPEARANCE UR: ABNORMAL
AST SERPL-CCNC: 561 U/L (ref 15–37)
BACTERIA URNS QL MICRO: NEGATIVE /HPF
BARBITURATES UR QL SCN: NEGATIVE
BASOPHILS # BLD: 0 K/UL (ref 0–0.1)
BASOPHILS NFR BLD: 1 % (ref 0–1)
BENZODIAZ UR QL: NEGATIVE
BILIRUB SERPL-MCNC: 3.2 MG/DL (ref 0.2–1)
BILIRUB UR QL CFM: POSITIVE
BUN SERPL-MCNC: 8 MG/DL (ref 6–20)
BUN/CREAT SERPL: 11 (ref 12–20)
CALCIUM SERPL-MCNC: 9.2 MG/DL (ref 8.5–10.1)
CANNABINOIDS UR QL SCN: NEGATIVE
CHLORIDE SERPL-SCNC: 108 MMOL/L (ref 97–108)
CHOLEST SERPL-MCNC: 164 MG/DL
CO2 SERPL-SCNC: 26 MMOL/L (ref 21–32)
COCAINE UR QL SCN: NEGATIVE
COLOR UR: ABNORMAL
COMMENT, HOLDF: NORMAL
CREAT SERPL-MCNC: 0.73 MG/DL (ref 0.55–1.02)
D DIMER PPP FEU-MCNC: 1.05 MG/L FEU (ref 0–0.65)
DIFFERENTIAL METHOD BLD: ABNORMAL
DRUG SCRN COMMENT,DRGCM: NORMAL
EOSINOPHIL # BLD: 0.1 K/UL (ref 0–0.4)
EOSINOPHIL NFR BLD: 1 % (ref 0–7)
EPITH CASTS URNS QL MICRO: ABNORMAL /LPF
ERYTHROCYTE [DISTWIDTH] IN BLOOD BY AUTOMATED COUNT: 12 % (ref 11.5–14.5)
EST. AVERAGE GLUCOSE BLD GHB EST-MCNC: 114 MG/DL
GLOBULIN SER CALC-MCNC: 3.2 G/DL (ref 2–4)
GLUCOSE SERPL-MCNC: 96 MG/DL (ref 65–100)
GLUCOSE UR STRIP.AUTO-MCNC: NEGATIVE MG/DL
HAV IGM SER QL: NONREACTIVE
HBA1C MFR BLD: 5.6 % (ref 4–5.6)
HBV CORE IGM SER QL: NONREACTIVE
HBV SURFACE AG SER QL: <0.1 INDEX
HBV SURFACE AG SER QL: NEGATIVE
HCT VFR BLD AUTO: 39.9 % (ref 35–47)
HCV AB SERPL QL IA: NONREACTIVE
HDLC SERPL-MCNC: 59 MG/DL
HDLC SERPL: 2.8 (ref 0–5)
HGB BLD-MCNC: 12.8 G/DL (ref 11.5–16)
HGB UR QL STRIP: ABNORMAL
HYALINE CASTS URNS QL MICRO: ABNORMAL /LPF (ref 0–5)
IMM GRANULOCYTES # BLD AUTO: 0 K/UL (ref 0–0.04)
IMM GRANULOCYTES NFR BLD AUTO: 1 % (ref 0–0.5)
KETONES UR QL STRIP.AUTO: NEGATIVE MG/DL
LDLC SERPL CALC-MCNC: 88.4 MG/DL (ref 0–100)
LEUKOCYTE ESTERASE UR QL STRIP.AUTO: ABNORMAL
LYMPHOCYTES # BLD: 1.3 K/UL (ref 0.8–3.5)
LYMPHOCYTES NFR BLD: 37 % (ref 12–49)
MAGNESIUM SERPL-MCNC: 2.1 MG/DL (ref 1.6–2.4)
MCH RBC QN AUTO: 30.8 PG (ref 26–34)
MCHC RBC AUTO-ENTMCNC: 32.1 G/DL (ref 30–36.5)
MCV RBC AUTO: 95.9 FL (ref 80–99)
METHADONE UR QL: NEGATIVE
MONOCYTES # BLD: 0.2 K/UL (ref 0–1)
MONOCYTES NFR BLD: 7 % (ref 5–13)
NEUTS SEG # BLD: 1.9 K/UL (ref 1.8–8)
NEUTS SEG NFR BLD: 53 % (ref 32–75)
NITRITE UR QL STRIP.AUTO: NEGATIVE
NRBC # BLD: 0 K/UL (ref 0–0.01)
NRBC BLD-RTO: 0 PER 100 WBC
OPIATES UR QL: NEGATIVE
PCP UR QL: NEGATIVE
PH UR STRIP: 7 (ref 5–8)
PHOSPHATE SERPL-MCNC: 3.1 MG/DL (ref 2.6–4.7)
PLATELET # BLD AUTO: 159 K/UL (ref 150–400)
PMV BLD AUTO: 10 FL (ref 8.9–12.9)
POTASSIUM SERPL-SCNC: 3.4 MMOL/L (ref 3.5–5.1)
PROT SERPL-MCNC: 6.2 G/DL (ref 6.4–8.2)
PROT UR STRIP-MCNC: NEGATIVE MG/DL
RBC # BLD AUTO: 4.16 M/UL (ref 3.8–5.2)
RBC #/AREA URNS HPF: ABNORMAL /HPF (ref 0–5)
SAMPLES BEING HELD,HOLD: NORMAL
SODIUM SERPL-SCNC: 138 MMOL/L (ref 136–145)
SP GR UR REFRACTOMETRY: 1.02 (ref 1–1.03)
SP1: NORMAL
SP2: NORMAL
SP3: NORMAL
TRIGL SERPL-MCNC: 83 MG/DL (ref ?–150)
TROPONIN I SERPL HS-MCNC: 3 NG/L (ref 0–37)
TSH SERPL DL<=0.05 MIU/L-ACNC: 1.3 UIU/ML (ref 0.36–3.74)
UROBILINOGEN UR QL STRIP.AUTO: 2 EU/DL (ref 0.2–1)
VLDLC SERPL CALC-MCNC: 16.6 MG/DL
WBC # BLD AUTO: 3.5 K/UL (ref 3.6–11)
WBC URNS QL MICRO: ABNORMAL /HPF (ref 0–4)

## 2023-04-20 PROCEDURE — 80074 ACUTE HEPATITIS PANEL: CPT

## 2023-04-20 PROCEDURE — 74011250636 HC RX REV CODE- 250/636: Performed by: PHYSICIAN ASSISTANT

## 2023-04-20 PROCEDURE — 85379 FIBRIN DEGRADATION QUANT: CPT

## 2023-04-20 PROCEDURE — 74011250637 HC RX REV CODE- 250/637: Performed by: NURSE PRACTITIONER

## 2023-04-20 PROCEDURE — 99223 1ST HOSP IP/OBS HIGH 75: CPT | Performed by: SPECIALIST

## 2023-04-20 PROCEDURE — 82140 ASSAY OF AMMONIA: CPT

## 2023-04-20 PROCEDURE — 36415 COLL VENOUS BLD VENIPUNCTURE: CPT

## 2023-04-20 PROCEDURE — 74181 MRI ABDOMEN W/O CONTRAST: CPT

## 2023-04-20 PROCEDURE — 80053 COMPREHEN METABOLIC PANEL: CPT

## 2023-04-20 PROCEDURE — 83735 ASSAY OF MAGNESIUM: CPT

## 2023-04-20 PROCEDURE — 84443 ASSAY THYROID STIM HORMONE: CPT

## 2023-04-20 PROCEDURE — 65270000046 HC RM TELEMETRY

## 2023-04-20 PROCEDURE — 81001 URINALYSIS AUTO W/SCOPE: CPT

## 2023-04-20 PROCEDURE — 80061 LIPID PANEL: CPT

## 2023-04-20 PROCEDURE — 85025 COMPLETE CBC W/AUTO DIFF WBC: CPT

## 2023-04-20 PROCEDURE — 74011250636 HC RX REV CODE- 250/636: Performed by: NURSE PRACTITIONER

## 2023-04-20 PROCEDURE — 74011250636 HC RX REV CODE- 250/636: Performed by: INTERNAL MEDICINE

## 2023-04-20 PROCEDURE — 84100 ASSAY OF PHOSPHORUS: CPT

## 2023-04-20 PROCEDURE — 74011250637 HC RX REV CODE- 250/637: Performed by: INTERNAL MEDICINE

## 2023-04-20 PROCEDURE — 83036 HEMOGLOBIN GLYCOSYLATED A1C: CPT

## 2023-04-20 PROCEDURE — 84484 ASSAY OF TROPONIN QUANT: CPT

## 2023-04-20 PROCEDURE — 80307 DRUG TEST PRSMV CHEM ANLYZR: CPT

## 2023-04-20 RX ORDER — METRONIDAZOLE 500 MG/100ML
500 INJECTION, SOLUTION INTRAVENOUS EVERY 12 HOURS
Status: DISCONTINUED | OUTPATIENT
Start: 2023-04-20 | End: 2023-04-22 | Stop reason: HOSPADM

## 2023-04-20 RX ORDER — LEVOFLOXACIN 5 MG/ML
750 INJECTION, SOLUTION INTRAVENOUS EVERY 24 HOURS
Status: DISCONTINUED | OUTPATIENT
Start: 2023-04-20 | End: 2023-04-22 | Stop reason: HOSPADM

## 2023-04-20 RX ORDER — ONDANSETRON 2 MG/ML
4 INJECTION INTRAMUSCULAR; INTRAVENOUS
Status: DISCONTINUED | OUTPATIENT
Start: 2023-04-20 | End: 2023-04-22 | Stop reason: HOSPADM

## 2023-04-20 RX ORDER — FENTANYL CITRATE 50 UG/ML
50 INJECTION, SOLUTION INTRAMUSCULAR; INTRAVENOUS
Status: DISCONTINUED | OUTPATIENT
Start: 2023-04-20 | End: 2023-04-22 | Stop reason: HOSPADM

## 2023-04-20 RX ORDER — SUMATRIPTAN 25 MG/1
50 TABLET, FILM COATED ORAL
Status: DISCONTINUED | OUTPATIENT
Start: 2023-04-20 | End: 2023-04-22 | Stop reason: HOSPADM

## 2023-04-20 RX ORDER — POTASSIUM CHLORIDE 750 MG/1
20 TABLET, FILM COATED, EXTENDED RELEASE ORAL
Status: COMPLETED | OUTPATIENT
Start: 2023-04-20 | End: 2023-04-20

## 2023-04-20 RX ORDER — IBUPROFEN 200 MG
400 TABLET ORAL
Status: DISCONTINUED | OUTPATIENT
Start: 2023-04-20 | End: 2023-04-22 | Stop reason: HOSPADM

## 2023-04-20 RX ORDER — SODIUM CHLORIDE 0.9 % (FLUSH) 0.9 %
5-40 SYRINGE (ML) INJECTION EVERY 8 HOURS
Status: DISCONTINUED | OUTPATIENT
Start: 2023-04-20 | End: 2023-04-22 | Stop reason: HOSPADM

## 2023-04-20 RX ORDER — POTASSIUM CHLORIDE 7.45 MG/ML
10 INJECTION INTRAVENOUS
Status: DISPENSED | OUTPATIENT
Start: 2023-04-20 | End: 2023-04-20

## 2023-04-20 RX ORDER — FAMOTIDINE 20 MG/1
20 TABLET, FILM COATED ORAL
Status: DISCONTINUED | OUTPATIENT
Start: 2023-04-20 | End: 2023-04-22 | Stop reason: HOSPADM

## 2023-04-20 RX ORDER — POLYETHYLENE GLYCOL 3350 17 G/17G
17 POWDER, FOR SOLUTION ORAL DAILY PRN
Status: DISCONTINUED | OUTPATIENT
Start: 2023-04-20 | End: 2023-04-22 | Stop reason: HOSPADM

## 2023-04-20 RX ORDER — LISINOPRIL 10 MG/1
10 TABLET ORAL DAILY
Status: DISCONTINUED | OUTPATIENT
Start: 2023-04-20 | End: 2023-04-22 | Stop reason: HOSPADM

## 2023-04-20 RX ORDER — SODIUM CHLORIDE 0.9 % (FLUSH) 0.9 %
5-40 SYRINGE (ML) INJECTION AS NEEDED
Status: DISCONTINUED | OUTPATIENT
Start: 2023-04-20 | End: 2023-04-22 | Stop reason: HOSPADM

## 2023-04-20 RX ORDER — ONDANSETRON 4 MG/1
4 TABLET, ORALLY DISINTEGRATING ORAL
Status: DISCONTINUED | OUTPATIENT
Start: 2023-04-20 | End: 2023-04-22 | Stop reason: HOSPADM

## 2023-04-20 RX ORDER — ENOXAPARIN SODIUM 100 MG/ML
40 INJECTION SUBCUTANEOUS DAILY
Status: DISCONTINUED | OUTPATIENT
Start: 2023-04-20 | End: 2023-04-22 | Stop reason: HOSPADM

## 2023-04-20 RX ORDER — SODIUM CHLORIDE, SODIUM LACTATE, POTASSIUM CHLORIDE, CALCIUM CHLORIDE 600; 310; 30; 20 MG/100ML; MG/100ML; MG/100ML; MG/100ML
100 INJECTION, SOLUTION INTRAVENOUS CONTINUOUS
Status: DISCONTINUED | OUTPATIENT
Start: 2023-04-20 | End: 2023-04-22 | Stop reason: HOSPADM

## 2023-04-20 RX ADMIN — LISINOPRIL 10 MG: 10 TABLET ORAL at 09:27

## 2023-04-20 RX ADMIN — POTASSIUM CHLORIDE 20 MEQ: 750 TABLET, FILM COATED, EXTENDED RELEASE ORAL at 14:54

## 2023-04-20 RX ADMIN — IBUPROFEN 400 MG: 200 TABLET, FILM COATED ORAL at 12:12

## 2023-04-20 RX ADMIN — METRONIDAZOLE 500 MG: 500 INJECTION, SOLUTION INTRAVENOUS at 14:54

## 2023-04-20 RX ADMIN — FENTANYL CITRATE 50 MCG: 50 INJECTION, SOLUTION INTRAMUSCULAR; INTRAVENOUS at 05:29

## 2023-04-20 RX ADMIN — LEVOFLOXACIN 750 MG: 5 INJECTION, SOLUTION INTRAVENOUS at 14:55

## 2023-04-20 RX ADMIN — SUMATRIPTAN SUCCINATE 50 MG: 25 TABLET ORAL at 12:12

## 2023-04-20 RX ADMIN — POTASSIUM CHLORIDE 10 MEQ: 7.46 INJECTION, SOLUTION INTRAVENOUS at 12:12

## 2023-04-20 RX ADMIN — SODIUM CHLORIDE, POTASSIUM CHLORIDE, SODIUM LACTATE AND CALCIUM CHLORIDE 100 ML/HR: 600; 310; 30; 20 INJECTION, SOLUTION INTRAVENOUS at 02:00

## 2023-04-20 NOTE — CONSULTS
[facility]                    Cardiology Care Note     [x]Initial Encounter     []Follow-up    Patient Name: Wendie Valencia - IMT:5/17/2310 - AYDEE:[id number]  Primary Cardiologist: [address] [place] [address] Cardiology Physicians: Billie Grullon MD  Consulting Cardiologist: [address] [place] [address] Cardiology Physicians: Billie Grullon MD     Reason for encounter: chest pain    HPI:   Wendie Valencia is a 65 y.o. female with PMH significant for HTN, CHALO, prediabetes, cholecystectomy 2021. She presents with chief c/o chest and epigastric pain. Reports she developed epigastric pain yesterday and was seen in ER yesterday evening. She  had elevated liver enzymes, was given morphine for pain and pain improved. She was discharged home. Then approximately 4AM she woke up with tightness across chess and increased epigastric pain. No SOB. She had some associated nausea but no vomiting. . She says the tightness did not last as long as epigastric pain which has been continuous. Chest tightness was not exertional, not worse with ambulation. She states these were the same symptoms she had prior to cholecystectomy. She said initially she had a stress test for those symptoms- stress echo was normal 12/2021. Has had serial troponins which were normal and EKG with no ischemia. , , Alk phos 165. She does report dark urine and pale stools. Subjective: Wendie Valencia reports no further chest tightness. C/o epigastric pain. Concerned about elevated liver enzymes. Assessment and Plan        Chest tightness:   Resolved. HS troponin normal 4-5-3. EKG SB with no ischemic changes. Suspect GI issues likely involved in symptoms. Recommend GI evaluation. No need for stress testing today. Dr. Margarita Noguera to see. 2. Hypokalemia: repleted    3. HTN:  /80s  Continue lisinopril 10 mg daily.       3.  Elevated LFTs:    CT abdomen pelvis, no acute findings. GI Evaluation. Denies ETOH use. Attending Assessment/Plan/Discussion:     Patient seen on the day of progress note and examined  and agree with Nurse Practitioner  assessment and plans as amended. Kwame Ami  65 y.o. I previously seen patient for [address] [place] [address] Road office referral 11/1/2021 for chest pain. She had abnormal liver enzymes at that time. Stress echo 12/20/2021 was normal at 6 minutes. She says her pain resolved. She had a cholecystectomy. Yesterday 4 AM she had sudden onset of substernal chest pain starting from back behind the shoulder blades and going to the epigastric area with belching and nausea. She was found to have markedly elevated LFTs. Family history of CAD. She feels nauseous and ill overall. Her urinalysis is cloudy with positive for bilirubin white count is low at 3.5 hemoglobin 12.8. Her troponins are extremely low at 4, 5 and 3. Her ALT is 581 was 75 on admission her AST was 93 is now 561 and alk phos is 165. Source of her transaminitis is unclear she had mild gastritis 1 week ago. She is now had a CT scan and MRI CT scan showed no acute process in the abdomen. MRI showed trace fluid around the extrahepatic bile ducts consideration for cholangitis. Patient undergo ERCP  EKG sinus bradycardia WNL     Impression   patient with very low troponins ,atypical chest pain and seems to have an abdominal source of her symptoms and problems with marked elevation in her LFTs. No cardiac testing is planned at this time beyond echocardiogram to confirm LV function. Do not currently anticipate a stress test unless there is chest pain that worsens or there is on a condition for her current constellation of symptoms    Mimi Martínez MD 4/20/2023           ____________________________________________________________    Cardiac testing  12/20/21    ECHO STRESS 12/20/2021 1/3/2022    Interpretation Summary    Study Impression: The study is normal.    Post-stress Echo:  The post-stress echo showed no new wall motion abnormalities. Left Ventricle: Left ventricle size is normal. Normal wall thickness. Normal wall motion. Normal left ventricular systolic function. Normal diastolic function. ECG: Stress ECG was negative for ischemia. ECG: Resting ECG demonstrates normal sinus rhythm. Stress Test: A Fernie protocol stress test was performed. Overall, the patient's exercise capacity was average for their age. The patient exercised for 00:06:01 . Hemodynamics are adequate for diagnosis. Blood pressure demonstrated a normal response and heart rate demonstrated a normal response to stress. The patient's heart rate recovery was normal.    Signed by: Kev Soto MD on 12/20/2021  2:06 PM              Most recent HS troponins:  Recent Labs     04/20/23  0525 04/19/23  0751 04/19/23  0545   TROPHS 3 5 4       ECG: sinus bradycardia, no ischemic changes. Review of Systems:    [x]All other systems reviewed and all negative except as written in HPI    [] Patient unable to provide secondary to condition    Past Medical History:   Diagnosis Date    AR (allergic rhinitis) 08/23/2010    Arthritis     FINGERS    Chronic pain     LOWER BACK    Diverticulitis     Hemorrhoids     Hyperinsulinism     Migraine 08/23/2010    Nasal fracture     Obesity     Obstructive sleep apnea     Prediabetes     Sleep apnea     Unspecified sleep apnea     C-PAP     Past Surgical History:   Procedure Laterality Date    HX CHOLECYSTECTOMY  2022    HX DILATION AND CURETTAGE      SEVERAL TIMES    HX GI      COLONOSCOPY, POLYPS    HX HERNIA REPAIR      RT. INGUINAL    HX ORTHOPAEDIC      right knee surgery    HX OTHER SURGICAL      CORTISONE INJECTIONS IN DANYELL. KNEES    ND BX BREAST NEEDLE CORE W/O IMAGING GUIDANCE SPX  1994    RT. BENIGN     Social Hx:  reports that she has never smoked. She has never used smokeless tobacco. She reports current alcohol use of about 0.8 standard drinks per week.  She reports that she does not use drugs. Family Hx: family history includes Cancer in her father and mother. Allergies   Allergen Reactions    Codeine Rash and Itching    Other Medication Itching     Pt stated she is allergic to MRI contrast, but has no problem with CT contrast    Sulfa (Sulfonamide Antibiotics) Rash and Itching          OBJECTIVE:  Wt Readings from Last 3 Encounters:   04/19/23 96.7 kg (213 lb 3 oz)   04/19/23 99.7 kg (219 lb 12.8 oz)   12/20/21 105.7 kg (233 lb)     No intake or output data in the 24 hours ending 04/20/23 1251    Physical Exam:    Vitals:   Vitals:    04/20/23 0600 04/20/23 0813 04/20/23 1000 04/20/23 1153   BP:  (!) 142/81  (!) 145/87   Pulse: (!) 53 (!) 57 (!) 53 (!) 57   Resp:  16  16   Temp:  97.9 °F (36.6 °C)  97.7 °F (36.5 °C)   SpO2:  96%  96%   Weight:       Height:         Telemetry: sinus bradycardia, HR 50's    Gen: Well-developed, well-nourished, in no acute distress  Neck: Supple, No JVD, No Carotid Bruit  Resp: No accessory muscle use, Clear breath sounds, No rales or rhonchi  Card: Regular Rate,Rythm, Normal S1, S2, No murmurs, rubs or gallop. No thrills. Abd:   Soft, +epigastric ttp, non-distended, BS+   MSK: No cyanosis  Skin: No rashes    Neuro: Moving all four extremities, follows commands appropriately  Psych: Good insight, oriented to person, place, alert, Nml Affect  LE: No edema    Data Review:     Radiology:   XR Results (most recent):  Results from Hospital Encounter encounter on 04/19/23    XR CHEST PORT    Narrative  EXAM:  XR CHEST PORT    INDICATION: Chest pain    COMPARISON: 10/21/2021    TECHNIQUE: Portable chest AP view    FINDINGS: The cardiac silhouette is within normal limits. The lungs and pleural  spaces are clear. The visualized bones and upper abdomen are unremarkable. Impression  No acute process.       Recent Labs     04/20/23  0525 04/19/23  1757    138   K 3.4* 3.9    102   CO2 26 25   BUN 8 11   CREA 0.73 0.81   GLU 96 117*   PHOS 3.1 --    CA 9.2 9.0     Recent Labs     04/20/23  0525 04/19/23  1757   WBC 3.5* 5.5   HGB 12.8 14.0   HCT 39.9 41.8    168     Recent Labs     04/20/23  0525 04/19/23  1757   * 157*     Recent Labs     04/20/23  0525   CHOL 164   LDLC 88.4         Current meds:    Current Facility-Administered Medications:     fentaNYL citrate (PF) injection 50 mcg, 50 mcg, IntraVENous, Q4H PRN, Nani Joseph MD, 50 mcg at 04/20/23 0529    lactated Ringers infusion, 100 mL/hr, IntraVENous, CONTINUOUS, Nani Joseph MD, Last Rate: 100 mL/hr at 04/20/23 0200, 100 mL/hr at 04/20/23 0200    famotidine (PEPCID) tablet 20 mg, 20 mg, Oral, BID PRN, Nani Joseph MD    lisinopriL (PRINIVIL, ZESTRIL) tablet 10 mg, 10 mg, Oral, DAILY, Nani Joseph MD, 10 mg at 04/20/23 0927    SUMAtriptan (IMITREX) tablet 50 mg, 50 mg, Oral, Q8H PRN, Nani Joseph MD, 50 mg at 04/20/23 1212    sodium chloride (NS) flush 5-40 mL, 5-40 mL, IntraVENous, Q8H, Nani Joseph MD    sodium chloride (NS) flush 5-40 mL, 5-40 mL, IntraVENous, PRN, Nani Joseph MD    polyethylene glycol (MIRALAX) packet 17 g, 17 g, Oral, DAILY PRN, Nani Joseph MD    ondansetron (ZOFRAN ODT) tablet 4 mg, 4 mg, Oral, Q8H PRN **OR** ondansetron (ZOFRAN) injection 4 mg, 4 mg, IntraVENous, Q6H PRN, Nani Joseph MD    enoxaparin (LOVENOX) injection 40 mg, 40 mg, SubCUTAneous, DAILY, Nani Joseph MD    potassium chloride 10 mEq in 100 ml IVPB, 10 mEq, IntraVENous, Q1H, Clifford Erickson, NP, Last Rate: 100 mL/hr at 04/20/23 1212, 10 mEq at 04/20/23 1212    ibuprofen (MOTRIN) tablet 400 mg, 400 mg, Oral, Q6H PRN, Roxann Almeida NP, 400 mg at 04/20/23 1212    Clifford Erickson NP    [facility]  Call center: (q) [phone] (y) [phone]      Godfrey Ventura MD

## 2023-04-20 NOTE — PROGRESS NOTES
Bedside and Verbal shift change report given to Renalyn RN  (oncoming nurse) by Laila Walker  (offgoing nurse). Report included the following information SBAR.

## 2023-04-20 NOTE — PROGRESS NOTES
Hospitalist Progress Note  Alana Hogue NP          Date of Service:  2023  NAME:  Eloy Clarke  :  1960  MRN:  [id number]    Admission Summary:     Ms. Rosy Cummings is a 71-year-old woman with past medical history significant for obstructive sleep apnea, hypertension, prediabetes, migraine headache, and diverticulitis presented at the [facility] ED at [facility] with chest pain. The chest pain is midsternal in location with radiation to the back, constant dull ache, and 10/10 in severity. No known aggravating or relieving factors. The patient stated that she had a stress test done about 2 years ago that was negative. CT scan of the abdomen and pelvis was negative. but she was found to have elevated liver enzymes (has had cholecystectomy). Lipase is within normal limits. The first set of troponin was negative. Interval history / Subjective:        Patient was seen and examined this morning, she was sitting up in bed in no acute distress. Cooperative and interactive with assessment. Cardiology had just seen her, she is convinced this is \"not my heart\". Abdominal pain is very similar to when she had cholecystitis and her gallbladder removed. At time of assessment, abdominal pain is diminished and her major complaint is feeling as though she is getting a migraine. Assessment & Plan:     Chest pain  - Troponins are unremarkable  - Cardiology has been consulted, she has a history of chest pain with a negative stress in the past.  - D-dimer mildly elevated (1.05) - pain is controlled, no tachycardia and no SOB/hypoxia  -UDS was negative    Abnormal liver function tests. - previous cholecystectomy  - CT scan of the abdomen was negative for acute process  - MRCP of the abdomen showed trace fluid around the extrahepatic bile ducts and proximal duodenum. Possible cholangitis.   - Gastroenterology has been consulted  - Hepatitis panel pending    Obstructive sleep apnea  - does not want to be placed on CPAP    Hypertension  - Blood pressure mildly elevated, continue with lisinopril    Hypokalemia, mild  - Cardiology ordered replacement    Prediabetes  - hold Glucophage.    - hemoglobin A1c 5.6    Migraine headache.  - continue with preadmission medication  - May have ibuprofen as she takes this alongside her sumatriptan      Code status: Full  Prophylaxis: lovenox  Care Plan discussed with: Patient, nurse, Dr. Dilma More  Anticipated Disposition: home when able     Hospital Problems  Date Reviewed: 4/20/2023            Codes Class Noted POA    * (Principal) Chest pain ICD-10-CM: R07.9  ICD-9-CM: 786.50  4/20/2023 Yes        Transaminitis ICD-10-CM: R74.01  ICD-9-CM: 790.4  4/19/2023 Unknown         Review of Systems:     Denies headaches, dizziness  No chest pain or pressure  No shortness of breath or cough  GI \"soreness\", voiding freely, no BM  Has been OOB    Vital Signs:    Last 24hrs VS reviewed since prior progress note. Most recent are:  Visit Vitals  BP (!) 142/81   Pulse (!) 57   Temp 97.9 °F (36.6 °C)   Resp 16   Ht 5' 10\" (1.778 m)   Wt 96.7 kg (213 lb 3 oz)   SpO2 96%   BMI 30.59 kg/m²     No intake or output data in the 24 hours ending 04/20/23 0943     Physical Examination:     I had a face to face encounter with this patient and independently examined them on 4/20/2023 as outlined below:        General :  female sitting up in bed in no acute distress. Cooperative and interactive with assessment  HEENT:  EOMI, moist mucus membranes  Neck: Trachea midline  Chest: Clear to auscultation bilaterally, sats 97% on room air  CVS: RRR, heart rate 51. Sinus bradycardia on telemetry  Abd: soft/epigastrically tender, non distended, BS active  Ext: No edema. Moves all extremities.   Neuro/Psych: pleasant mood and affect,  sensory grossly within normal limit, Strength 5/5 in all extremities  Skin: warm      Data Review:   Review and/or order of clinical lab test  Review and/or order of tests in the radiology section of CPT  Review and/or order of tests in the medicine section of CPT    I have independently reviewed and interpreted patient's lab and all other diagnostic data    Notes reviewed from all clinical/nonclinical/nursing services involved in patient's clinical care. Care coordination discussions were held with appropriate clinical/nonclinical/ nursing providers based on care coordination needs. Labs:     Recent Labs     04/20/23 0525 04/19/23 1757   WBC 3.5* 5.5   HGB 12.8 14.0   HCT 39.9 41.8    168     Recent Labs     04/20/23 0525 04/19/23 1757 04/19/23  0545    138 142   K 3.4* 3.9 3.7    102 106   CO2 26 25 26   BUN 8 11 16   CREA 0.73 0.81 0.96   GLU 96 117* 128*   CA 9.2 9.0 9.0   MG 2.1  --   --    PHOS 3.1  --   --      Recent Labs     04/20/23 0525 04/19/23 1757 04/19/23  0545   * 545* 75   * 157* 99   TBILI 3.2* 1.7* 0.6   TP 6.2* 6.8 6.3*   ALB 3.0* 3.5 3.3*   GLOB 3.2 3.3 3.0   LPSE  --  171  --      No results for input(s): INR, PTP, APTT, INREXT in the last 72 hours. No results for input(s): FE, TIBC, PSAT, FERR in the last 72 hours. No results found for: FOL, RBCF   No results for input(s): PH, PCO2, PO2 in the last 72 hours. No results for input(s): CPK, CKNDX, TROIQ in the last 72 hours.     No lab exists for component: CPKMB  Lab Results   Component Value Date/Time    Cholesterol, total 164 04/20/2023 05:25 AM    HDL Cholesterol 59 04/20/2023 05:25 AM    LDL, calculated 88.4 04/20/2023 05:25 AM    Triglyceride 83 04/20/2023 05:25 AM    CHOL/HDL Ratio 2.8 04/20/2023 05:25 AM     No results found for: GLUCPOC  Lab Results   Component Value Date/Time    Color DARK YELLOW 04/20/2023 07:00 AM    Appearance CLOUDY (A) 04/20/2023 07:00 AM    Specific gravity 1.022 04/20/2023 07:00 AM    pH (UA) 7.0 04/20/2023 07:00 AM Protein Negative 04/20/2023 07:00 AM    Glucose Negative 04/20/2023 07:00 AM    Ketone Negative 04/20/2023 07:00 AM    Urobilinogen 2.0 (H) 04/20/2023 07:00 AM    Nitrites Negative 04/20/2023 07:00 AM    Leukocyte Esterase TRACE (A) 04/20/2023 07:00 AM    Epithelial cells FEW 04/20/2023 07:00 AM    Bacteria Negative 04/20/2023 07:00 AM    WBC 0-4 04/20/2023 07:00 AM    RBC 5-10 04/20/2023 07:00 AM     Medications Reviewed:     Current Facility-Administered Medications   Medication Dose Route Frequency    fentaNYL citrate (PF) injection 50 mcg  50 mcg IntraVENous Q4H PRN    lactated Ringers infusion  100 mL/hr IntraVENous CONTINUOUS    famotidine (PEPCID) tablet 20 mg  20 mg Oral BID PRN    lisinopriL (PRINIVIL, ZESTRIL) tablet 10 mg  10 mg Oral DAILY    SUMAtriptan (IMITREX) tablet 50 mg  50 mg Oral Q8H PRN    sodium chloride (NS) flush 5-40 mL  5-40 mL IntraVENous Q8H    sodium chloride (NS) flush 5-40 mL  5-40 mL IntraVENous PRN    polyethylene glycol (MIRALAX) packet 17 g  17 g Oral DAILY PRN    ondansetron (ZOFRAN ODT) tablet 4 mg  4 mg Oral Q8H PRN    Or    ondansetron (ZOFRAN) injection 4 mg  4 mg IntraVENous Q6H PRN    enoxaparin (LOVENOX) injection 40 mg  40 mg SubCUTAneous DAILY   ______________________________________________________________________  EXPECTED LENGTH OF STAY: - - -  ACTUAL LENGTH OF STAY:          1               Edilson Jalloh NP

## 2023-04-20 NOTE — PROGRESS NOTES
RUR: 8% Low    KIMBERLY: Anticipated discharge home. Patient's  will provide transport home once medically stable. Follow-up with PCP/specialist.     Primary Contact: , Nhung Medina, [phone]    Care Management Interventions  PCP Verified by CM: Yes (Dr. Adri Shelley - physical was Jan. 2023; however, last seen on 4/19)  Mode of Transport at Discharge: Other (see comment) ()  Transition of Care Consult (CM Consult): Discharge Planning  Discharge Durable Medical Equipment: No  Physical Therapy Consult: No  Occupational Therapy Consult: No  Speech Therapy Consult: No  Support Systems: Spouse/Significant Other, Other Family Member(s)  Confirm Follow Up Transport: Self  The Plan for Transition of Care is Related to the Following Treatment Goals : Home  Discharge Location  Patient Expects to be Discharged to[de-identified] Home with family assistance    Reason for Admission:  Chest pain                    RUR Score:      8% Low                Plan for utilizing home health:    Likely none    PCP: First and Last name:  Kuldeep Connor MD     Name of Practice:    Are you a current patient: Yes/No: Yes   Approximate date of last visit: 4/19/23   Can you participate in a virtual visit with your PCP: Yes                    Current Advanced Directive/Advance Care Plan: Full Code    Healthcare Decision Maker:   Click here to complete [address] including selection of the Healthcare Decision Maker Relationship (ie \"Primary\")             Primary Decision Maker: [address] - [phone]                  Transition of Care Plan:       Home     CM met with patient at bedside to introduce self and explain role. Patient lives with her  in a 2 story home with 3 steps to enter and 14 steps to enter the 2nd floor. Patient was independent with ADL's, IADL's and ambulation. Patient does not own any DME. No history of home health or IPR/SNF.  CM verified patient's PCP, demographics and insurance. Preferred pharmacy is [facility] on [id number] [facility] in [place] with no barriers obtaining needed prescriptions. Patient's  will provide transport home once medically stable.      Leann Romero, ESTELLA   [phone]

## 2023-04-20 NOTE — H&P
[address]  HISTORY AND PHYSICAL    Name:  Dara Aldana  MR#:  [id number]  :  1960  ACCOUNT #:  [de-identified]  ADMIT DATE:  2023      The patient was seen, evaluated, and admitted by me on 2023. PRIMARY CARE PHYSICIAN:  Roselyn Singh MD    SOURCE OF INFORMATION:  The patient and review of ED and old electronic medical record. CHIEF COMPLAINT:  Chest pain. HISTORY OF PRESENT ILLNESS:  This 60-year-old woman with past medical history significant for obstructive sleep apnea, hypertension, prediabetes, migraine headache, and diverticulitis presented at the [facility] emergency room at [facility] with chest pain. The chest pain is midsternal in location with radiation to the back, constant dull ache, and 10/10 in severity. No known aggravating or relieving factors. The patient stated that she had a stress test done about 2 years ago that was negative. The patient came to the emergency room because of worsening chest pain. When the patient arrived at the emergency room, CT scan of the abdomen and pelvis was obtained and this was negative. The patient was found to have elevated liver enzyme. The patient is status post cholecystectomy. Lipase is within normal limits. The first set of troponin was also negative. The patient was referred to the hospitalist service for admission. No record of prior admission to the hospitalist service at this hospital.    PAST MEDICAL HISTORY:  Obstructive sleep apnea, hypertension, prediabetes, migraine headache. ALLERGIES:  THE PATIENT IS ALLERGIC TO CODEINE AND SULFA. MEDICATIONS:  1. Pepcid 20 mg twice daily. 2.  Allegra 180 mg daily. 3.  Lisinopril 10 mg daily. 4.  Glucophage 1000 mg daily. 5.  Multivitamin 1 tablet daily. 6.  Evista 60 mg daily. 7.  Imitrex, dosage as directed. FAMILY HISTORY:  This was reviewed. Her mother had breast cancer. Her father had mesothelioma.     PAST SURGICAL HISTORY:  This is significant for cholecystectomy, right inguinal hernia repair, and right knee surgery. SOCIAL HISTORY:  No history of alcohol or tobacco abuse. REVIEW OF SYSTEMS:  HEAD, EYES, EARS, NOSE AND THROAT:  No headache, no dizziness, no blurring of vision, and no photophobia. RESPIRATORY SYSTEM:  No cough, no shortness of breath, and no hemoptysis. CARDIOVASCULAR SYSTEM:  This is positive for chest pain. No orthopnea and no palpitation. GASTROINTESTINAL SYSTEM:  No nausea or vomiting. No diarrhea and no constipation. GENITOURINARY SYSTEM:  No dysuria, no urgency and no frequency. All other systems are reviewed and they are negative. PHYSICAL EXAMINATION:  GENERAL APPEARANCE:  The patient appeared ill and in moderate distress. VITAL SIGNS:  On arrival at the emergency room; temperature 97.9, pulse 55, respiratory rate 12, blood pressure 168/105, and oxygen saturation 96%. HEAD:  Normocephalic, atraumatic. EYES:  Normal eye movement. No redness, no drainage, and no discharge. EARS:  Normal external ears with no obvious drainage. NOSE:  No deformity and no drainage. MOUTH AND THROAT:  No visible oral lesion. NECK:  Neck is supple. No JVD and no thyromegaly. CHEST:  Clear breath sounds. No wheezing and no crackles. HEART:  Normal S1 and S2, regular. No clinically appreciable murmur. ABDOMEN:  Soft. Mild epigastric tenderness. No rebound tenderness. No guarding. Normal bowel sounds. CNS:  Alert and oriented x3. No gross focal neurological deficit. EXTREMITIES:  No edema. Pulses 2+ bilaterally. MUSCULOSKELETAL SYSTEM:  No obvious joint deformity and swelling. SKIN:  No active skin lesions seen in the exposed part of the body. PSYCHIATRY:  Normal mood and affect. LYMPHATIC SYSTEM:  No cervical lymphadenopathy. DIAGNOSTIC DATA:  The EKG shows sinus bradycardia. No significant ST and T-waves abnormalities.   The CT scan of the abdomen and pelvis, no acute process. Chest x-ray, no acute process. LABORATORY DATA:  Hematology; WBC 5.5, hemoglobin 14.0, hematocrit 41.8, and platelets 124. Lipase level 171. Chemistry; sodium 138, potassium 3.9, chloride 102, CO2 is 25, glucose 117, BUN 11, creatinine 0.81, calcium 9.0, total bilirubin 1.7, , , alkaline phosphatase 157, total protein 6.8, albumin level 3.5, and globulin 3.3. ASSESSMENT:  1. Chest pain. 2.  Abnormal liver function tests. 3.  Obstructive sleep apnea. 4.  Hypertension. 5.  Prediabetes. 6.  Migraine headache. PLAN:  1. Chest pain. We will admit the patient for further evaluation and treatment. We will check serial cardiac markers to rule out acute myocardial infarction. Cardiology consult will be requested to assist in further evaluation and treatment. We will check D-dimer to evaluate the patient for pulmonary embolism as another possible cause of atypical chest pain. We will carry out pain control. The patient will be closely monitored. We will also check urine drug screen. 2.  Abnormal liver function tests. The patient is status post cholecystectomy. We will obtain MRCP of the abdomen to evaluate the patient for common bile duct stone. Gastroenterology consult will be requested to assist in further evaluation and treatment as well. 3.  Obstructive sleep apnea. The patient does not want to be placed on CPAP. We will continue to monitor. 4.  Hypertension. We will resume preadmission medication and monitor the patient's blood pressure closely. 5.  Prediabetes. We will hold Glucophage. We will check hemoglobin A1c level. 6.  Migraine headache. We will continue with preadmission medication. 7.  Other issues. Code status, the patient is a full code. We will place the patient on Lovenox for DVT prophylaxis. FUNCTIONAL STATUS PRIOR TO ADMISSION:  The patient came from home. The patient is ambulatory with no assistive device.     COVID PRECAUTION:  The patient was wearing a face mask. I was wearing a face mask and gloves for this patient's encounter.       Jaam Eastman MD      [id number]  D:  04/20/2023 4:29  T:  04/20/2023 5:45  JOB #:  [id number]  CC:  Seymour Helms MD

## 2023-04-20 NOTE — CONSULTS
[address]    [address] [address]        GASTROENTEROLOGY CONSULTATION NOTE  Will Angie Orta  [phone] office  [phone] NP/PA in-hospital cell phone M-F until 4:30PM  After 5PM or on weekends, please call  for physician on call        NAME:  Josette Clinton   :   1960   MRN:   [id number]       Referring Physician: Dr. Bienvenido Cartwright Date: 2023 10:49 AM    Chief Complaint: epigastric abdominal pain/chest pain     History of Present Illness:  Patient is a 65 y.o. who is seen in consultation at the request of Dr. Nikki Tyler for abnormal LFTs. Patient has a past medical history significant for hypertension, prediabetes, migraines, and sleep apnea. She presented to the ED at [facility] with complaints of chest pain. She was transferred to [facility] for admission on 23 for chest pain and abnormal LFTs. Patient reports an acute onset of substernal chest pain/upper abdominal pain with radiation to the back yesterday morning upon awakening. She was initially seen in the ED and discharged. She was seen in follow up by her PCP and referred back to the ED. There has been some associated nausea. No vomiting. No fevers or chills. No jaundice.  + Dark colored urine and pale stools. Patient reports nausea/vomiting/diarrhea last Friday that improved over the weekend. History of cholecystectomy in . Occasional NSAID use. No anticoagulation prior to admission. Rare alcohol use. No tobacco, marijuana, or drug use. I have reviewed the emergency room note, hospital admission note, notes by all other clinicians who have seen the patient during this hospitalization to date. I have reviewed the problem list and the reason for this hospitalization. I have reviewed the allergies and the medications the patient was taking at home prior to this hospitalization.     PMH:  Past Medical History:   Diagnosis Date    AR (allergic rhinitis) 2010    Arthritis     FINGERS    Chronic pain     LOWER BACK    Diverticulitis     Hemorrhoids     Hyperinsulinism     Migraine 2010    Nasal fracture     Obesity     Obstructive sleep apnea     Prediabetes     Sleep apnea     Unspecified sleep apnea     C-PAP       PSH:  Past Surgical History:   Procedure Laterality Date    HX CHOLECYSTECTOMY      HX DILATION AND CURETTAGE      SEVERAL TIMES    HX GI      COLONOSCOPY, POLYPS    HX HERNIA REPAIR      RT. INGUINAL    HX ORTHOPAEDIC      right knee surgery    HX OTHER SURGICAL      CORTISONE INJECTIONS IN DANYELL. KNEES    MT BX BREAST NEEDLE CORE W/O IMAGING GUIDANCE SPX      RT. BENIGN       Allergies: Allergies   Allergen Reactions    Codeine Rash and Itching    Other Medication Itching     Pt stated she is allergic to MRI contrast, but has no problem with CT contrast    Sulfa (Sulfonamide Antibiotics) Rash and Itching       Home Medications:  Prior to Admission Medications   Prescriptions Last Dose Informant Patient Reported? Taking? SUMAtriptan (IMITREX) 100 mg tablet   Yes No   Sig: sumatriptan 100 mg tablet   famotidine (Pepcid) 20 mg tablet 2023  No Yes   Sig: Take 1 Tablet by mouth two (2) times daily as needed for Heartburn. fexofenadine (ALLEGRA) 180 mg tablet   Yes No   Sig: Take 180 mg by mouth daily. fluticasone (Flonase Sensimist) 27.5 mcg/actuation nasal spray   Yes No   Si spray in each nostril Nasally Once a day for 30 day(s)   lisinopriL (PRINIVIL, ZESTRIL) 10 mg tablet   Yes No   Sig: Take 1 Tablet by mouth daily. metFORMIN ER (GLUCOPHAGE XR) 500 mg tablet   Yes No   Sig: metformin  mg tablet,extended release 24 hr   TAKE 2 TABLETS BY MOUTH TWICE A DAY   multivitamin (ONE A DAY) tablet   Yes No   Sig: Take 1 Tablet by mouth daily.    raloxifene (EVISTA) 60 mg tablet   Yes No   Sig: raloxifene 60 mg tablet   TAKE 1 TABLET BY MOUTH EVERY DAY      Facility-Administered Medications: None       Hospital Medications:  Current Facility-Administered Medications   Medication Dose Route Frequency    fentaNYL citrate (PF) injection 50 mcg  50 mcg IntraVENous Q4H PRN    lactated Ringers infusion  100 mL/hr IntraVENous CONTINUOUS    famotidine (PEPCID) tablet 20 mg  20 mg Oral BID PRN    lisinopriL (PRINIVIL, ZESTRIL) tablet 10 mg  10 mg Oral DAILY    SUMAtriptan (IMITREX) tablet 50 mg  50 mg Oral Q8H PRN    sodium chloride (NS) flush 5-40 mL  5-40 mL IntraVENous Q8H    sodium chloride (NS) flush 5-40 mL  5-40 mL IntraVENous PRN    polyethylene glycol (MIRALAX) packet 17 g  17 g Oral DAILY PRN    ondansetron (ZOFRAN ODT) tablet 4 mg  4 mg Oral Q8H PRN    Or    ondansetron (ZOFRAN) injection 4 mg  4 mg IntraVENous Q6H PRN    enoxaparin (LOVENOX) injection 40 mg  40 mg SubCUTAneous DAILY    potassium chloride 10 mEq in 100 ml IVPB  10 mEq IntraVENous Q1H       Social History:  Social History     Tobacco Use    Smoking status: Never    Smokeless tobacco: Never   Substance Use Topics    Alcohol use:  Yes     Alcohol/week: 0.8 standard drinks     Types: 1 Glasses of wine per week       Family History:  Family History   Problem Relation Age of Onset    Cancer Mother         BREAST     Cancer Father         MESOTHELIOMA     Review of Systems:  Constitutional: negative fever, negative chills, negative weight loss  Eyes:   negative visual changes  ENT:   negative sore throat, tongue or lip swelling  Respiratory:  negative cough, negative dyspnea  Cards:  + for chest pain  GI:   See HPI  :  negative for frequency, dysuria  Integument:  negative for rash and pruritus  Heme:  negative for easy bruising and gum/nose bleeding  Musculoskeletal:negative for myalgias, back pain and muscle weakness  Neuro:    negative for headaches, dizziness  Psych: negative for feelings of anxiety, depression     Objective:   Patient Vitals for the past 8 hrs:   BP Temp Pulse Resp SpO2   04/20/23 1000 -- -- (!) 53 -- --   04/20/23 0813 (!) 142/81 97.9 °F (36.6 °C) (!) 57 16 96 %   04/20/23 0600 -- -- (!) 53 -- --   04/20/23 0346 -- -- (!) 49 -- --     No intake/output data recorded. No intake/output data recorded. EXAM:     CONST:  Pleasant female lying in bed, no acute distress   NEURO:  Alert and oriented x 3   HEENT: EOMI, no scleral icterus   LUNGS: No acute respiratory distress   CARD:  S1 S2   ABD:  Soft, non distended, epigastric/LUQ/RUQ tenderness, no rebound, no guarding. + Bowel sounds. EXT:  Warm   PSYCH: Full, not anxious or agitated     Data Review     Recent Labs     04/20/23  0525 04/19/23  1757   WBC 3.5* 5.5   HGB 12.8 14.0   HCT 39.9 41.8    168     Recent Labs     04/20/23  0525 04/19/23  1757    138   K 3.4* 3.9    102   CO2 26 25   BUN 8 11   CREA 0.73 0.81   GLU 96 117*   PHOS 3.1  --    CA 9.2 9.0     Recent Labs     04/20/23  0525 04/19/23  1757   * 157*   TP 6.2* 6.8   ALB 3.0* 3.5   GLOB 3.2 3.3   LPSE  --  171     No results for input(s): INR, PTP, APTT, INREXT in the last 72 hours. Assessment:     Elevated LFTs: WBC 3.5, , , alkaline phosphatase 165, total bilirubin 3.2 (1.7, LFTs unremarkable on presentation yesterday morning). Lipase normal. 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 MRI/MRCP (4/20/23): trace fluid around the extrahepatic bile ducts and proximal duodenum, etiology is unclear, cholangitis is possible; distal CBD measured 10, 7, and 6 mm respectively; mild intrahepatic biliary ductal dilation; no choledocholithiasis, overt ampullary mass, or overt pancreatic mass; no pancreatic duct dilation; cystic lesions consistent with sidebranch IPMNs. Afebrile. Differential includes choledocholithiasis versus hepatitis.    Epigastric/substernal chest pain  Chest pain: cardiology consulted  Hypertension  Prediabetes  Obstructive sleep apnea     Patient Active Problem List   Diagnosis Code    AR (allergic rhinitis) J30.9    Migraine G43.909    Sciatica of left side M54.32    Diverticulitis K57.92    Sleep apnea G47.30    Transaminitis R74.01    Chest pain R07.9     Plan:       Clear liquids  Supportive measures  Trend LFTs  Acute hepatitis panel  On Lovenox SQ  Cardiology evaluated  Discussed with Dr. Marian Pablo - no indication for ERCP. Patient was discussed with Dr. Tawanda Hogan and Dr. Marian Pablo  Thank you for allowing me to participate in care of Ginger Stone     Signed By: Valenciaaxelsvetlana Roy, [address]     4/20/2023  10:49 AM       Gastroenterology Attending Physician attestation statement and comments. This patient was seen and examined by me in a face-to-face visit today. I reviewed the medical record including lab work, imaging and other provider notes. I confirmed the history as described above. I spoke to the patient, reviewed the medical record including lab work, imaging and other provider notes. I discussed this case in detail with Sara HSIEH. I formulated an  assessment of this patient and developed a treatment plan. I agree with the above consultation note. I agree with the history, exam and assessment and plan as outlined in the note. I would like to add the following:     Abd: normoactive BS, nd, tender to deep palpation in the epigastric and RUQ, no rebound/guarding. Consider biliary sludge. Short course of Abx. Trend liver tests. No EtOH abuse. Discussed IPMNs with patient. She will follow-up with her primary GI-Dr. Kimber Polk. She understands and agrees with the plan.     Dr. Tawanda Hogan

## 2023-04-20 NOTE — PROGRESS NOTES
Clinical Pharmacy Note: Metronidazole Dosing    Please note that the metronidazole dose for Kenny Rodriguez has been changed to 500 mg IV q12h per [facility]-approved protocol. Please contact the pharmacy with any questions.     KATE AndresD

## 2023-04-21 LAB
ALBUMIN SERPL-MCNC: 2.9 G/DL (ref 3.5–5)
ALBUMIN/GLOB SERPL: 1 (ref 1.1–2.2)
ALP SERPL-CCNC: 168 U/L (ref 45–117)
ALT SERPL-CCNC: 476 U/L (ref 12–78)
ANION GAP SERPL CALC-SCNC: 5 MMOL/L (ref 5–15)
AST SERPL-CCNC: 279 U/L (ref 15–37)
BILIRUB SERPL-MCNC: 1.1 MG/DL (ref 0.2–1)
BUN SERPL-MCNC: 6 MG/DL (ref 6–20)
BUN/CREAT SERPL: 8 (ref 12–20)
CALCIUM SERPL-MCNC: 8.8 MG/DL (ref 8.5–10.1)
CHLORIDE SERPL-SCNC: 108 MMOL/L (ref 97–108)
CO2 SERPL-SCNC: 26 MMOL/L (ref 21–32)
CREAT SERPL-MCNC: 0.77 MG/DL (ref 0.55–1.02)
ERYTHROCYTE [DISTWIDTH] IN BLOOD BY AUTOMATED COUNT: 11.9 % (ref 11.5–14.5)
GLOBULIN SER CALC-MCNC: 3 G/DL (ref 2–4)
GLUCOSE SERPL-MCNC: 99 MG/DL (ref 65–100)
HCT VFR BLD AUTO: 39.8 % (ref 35–47)
HGB BLD-MCNC: 12.6 G/DL (ref 11.5–16)
MCH RBC QN AUTO: 31.1 PG (ref 26–34)
MCHC RBC AUTO-ENTMCNC: 31.7 G/DL (ref 30–36.5)
MCV RBC AUTO: 98.3 FL (ref 80–99)
NRBC # BLD: 0 K/UL (ref 0–0.01)
NRBC BLD-RTO: 0 PER 100 WBC
PLATELET # BLD AUTO: 144 K/UL (ref 150–400)
PMV BLD AUTO: 9.6 FL (ref 8.9–12.9)
POTASSIUM SERPL-SCNC: 3.8 MMOL/L (ref 3.5–5.1)
PROT SERPL-MCNC: 5.9 G/DL (ref 6.4–8.2)
RBC # BLD AUTO: 4.05 M/UL (ref 3.8–5.2)
SODIUM SERPL-SCNC: 139 MMOL/L (ref 136–145)
WBC # BLD AUTO: 3.4 K/UL (ref 3.6–11)

## 2023-04-21 PROCEDURE — 74011250636 HC RX REV CODE- 250/636: Performed by: PHYSICIAN ASSISTANT

## 2023-04-21 PROCEDURE — 36415 COLL VENOUS BLD VENIPUNCTURE: CPT

## 2023-04-21 PROCEDURE — 80053 COMPREHEN METABOLIC PANEL: CPT

## 2023-04-21 PROCEDURE — 65270000029 HC RM PRIVATE

## 2023-04-21 PROCEDURE — 85027 COMPLETE CBC AUTOMATED: CPT

## 2023-04-21 PROCEDURE — 74011250637 HC RX REV CODE- 250/637: Performed by: INTERNAL MEDICINE

## 2023-04-21 PROCEDURE — 74011250636 HC RX REV CODE- 250/636: Performed by: INTERNAL MEDICINE

## 2023-04-21 PROCEDURE — 74011000250 HC RX REV CODE- 250: Performed by: INTERNAL MEDICINE

## 2023-04-21 RX ADMIN — SODIUM CHLORIDE, PRESERVATIVE FREE 10 ML: 5 INJECTION INTRAVENOUS at 22:10

## 2023-04-21 RX ADMIN — SODIUM CHLORIDE, POTASSIUM CHLORIDE, SODIUM LACTATE AND CALCIUM CHLORIDE 100 ML/HR: 600; 310; 30; 20 INJECTION, SOLUTION INTRAVENOUS at 05:29

## 2023-04-21 RX ADMIN — LEVOFLOXACIN 750 MG: 5 INJECTION, SOLUTION INTRAVENOUS at 14:20

## 2023-04-21 RX ADMIN — LISINOPRIL 10 MG: 10 TABLET ORAL at 09:45

## 2023-04-21 RX ADMIN — METRONIDAZOLE 500 MG: 500 INJECTION, SOLUTION INTRAVENOUS at 14:20

## 2023-04-21 RX ADMIN — METRONIDAZOLE 500 MG: 500 INJECTION, SOLUTION INTRAVENOUS at 01:58

## 2023-04-21 NOTE — PROGRESS NOTES
RUR: 9% Low     KIMBERLY: Anticipated discharge home. Patient's  will provide transport home once medically stable. Follow-up with PCP/specialist.      Primary Contact: , Anastasia Ktaz, [phone]    11:34AM - CM reviewed chart. Per review and ID rounds, plan to monitor liver enzymes and advance as tolerated. No indication of ERCP at this time. Anticipated discharge within 48 hours pending medical progress. CM will continue to follow as needed.     Bobo Snow, ESTELLA   [phone]

## 2023-04-21 NOTE — PROGRESS NOTES
[address]  [address] [facility], [address]       GI PROGRESS NOTE  Merissa Arnold, [facility] office  [phone] NP in-hospital cell phone M-F until 4:30  After 5pm or on weekends, please call  for physician on call      NAME: Maru Valdes   :  1960   MRN:  [id number]       Subjective:   She continues to have improvement in pain, now just feeling sore and tried. Objective:     VITALS:   Last 24hrs VS reviewed since prior progress note. Most recent are:  Visit Vitals  /80   Pulse (!) 51   Temp 97.6 °F (36.4 °C)   Resp 18   Ht 5' 10\" (1.778 m)   Wt 96.7 kg (213 lb 3 oz)   SpO2 96%   BMI 30.59 kg/m²       PHYSICAL EXAM:  General: Cooperative, no acute distress    Neurologic:  Alert and oriented X 3. HEENT: EOMI, no scleral icterus   Lungs:  No increased WOB  Heart:  regular rate  Abdomen: Soft, non-distended, RUQ tenderness. Extremities: warm  Psych:   Good insight. Not anxious or agitated. Lab Data Reviewed:     Recent Results (from the past 24 hour(s))   HEPATITIS PANEL, ACUTE    Collection Time: 23  1:49 PM   Result Value Ref Range    Hepatitis A, IgM NONREACTIVE NR      __          Hepatitis B surface Ag <0.10 Index    Hep B surface Ag Interp. Negative NEG      __          Hepatitis B core, IgM NONREACTIVE NR      __          Hep C virus Ab Interp. NONREACTIVE NR     METABOLIC PANEL, COMPREHENSIVE    Collection Time: 23  2:07 AM   Result Value Ref Range    Sodium 139 136 - 145 mmol/L    Potassium 3.8 3.5 - 5.1 mmol/L    Chloride 108 97 - 108 mmol/L    CO2 26 21 - 32 mmol/L    Anion gap 5 5 - 15 mmol/L    Glucose 99 65 - 100 mg/dL    BUN 6 6 - 20 MG/DL    Creatinine 0.77 0.55 - 1.02 MG/DL    BUN/Creatinine ratio 8 (L) 12 - 20      eGFR >60 >60 ml/min/1.73m2    Calcium 8.8 8.5 - 10.1 MG/DL    Bilirubin, total 1.1 (H) 0.2 - 1.0 MG/DL    ALT (SGPT) 476 (H) 12 - 78 U/L    AST (SGOT) 279 (H) 15 - 37 U/L    Alk.  phosphatase 168 (H) 45 - 117 U/L    Protein, total 5.9 (L) 6.4 - 8.2 g/dL    Albumin 2.9 (L) 3.5 - 5.0 g/dL    Globulin 3.0 2.0 - 4.0 g/dL    A-G Ratio 1.0 (L) 1.1 - 2.2     CBC W/O DIFF    Collection Time: 04/21/23  2:07 AM   Result Value Ref Range    WBC 3.4 (L) 3.6 - 11.0 K/uL    RBC 4.05 3.80 - 5.20 M/uL    HGB 12.6 11.5 - 16.0 g/dL    HCT 39.8 35.0 - 47.0 %    MCV 98.3 80.0 - 99.0 FL    MCH 31.1 26.0 - 34.0 PG    MCHC 31.7 30.0 - 36.5 g/dL    RDW 11.9 11.5 - 14.5 %    PLATELET 629 (L) 655 - 400 K/uL    MPV 9.6 8.9 - 12.9 FL    NRBC 0.0 0  WBC    ABSOLUTE NRBC 0.00 0.00 - 0.01 K/uL            Assessment:     Elevated LFTs: CT abdomen/pelvis without contrast (4/19/23): no acute process; 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 MRI/MRCP (4/20/23): trace fluid around the extrahepatic bile ducts and proximal duodenum, etiology is unclear, cholangitis is possible; distal CBD measured 10, 7, and 6 mm respectively; mild intrahepatic biliary ductal dilation; no choledocholithiasis, overt ampullary mass, or overt pancreatic mass; no pancreatic duct dilation; cystic lesions consistent with sidebranch IPMNs. Afebrile. Acute hepatitis panel negative   Epigastric/substernal chest pain  Chest pain: cardiology evaluated   Hypertension  Prediabetes  Obstructive sleep apnea        Patient Active Problem List   Diagnosis Code    AR (allergic rhinitis) J30.9    Migraine G43.909    Sciatica of left side M54.32    Diverticulitis K57.92    Sleep apnea G47.30    Transaminitis R74.01    Chest pain R07.9     Plan:     CLD, advance as tolerated   Monitor LFT's  7 day course antibiotics   No indication for ERCP  Discussed with Linwood Goldstein and Andressa     Signed By: Martha Donnelly NP     4/21/2023  9:01 AM       GI attending note:    Vitals, labs, and imaging reviewed. Agree with note of PEPE. Liver tests trending down. Continue to follow.  Will sign off. Please call with any questions or concerns or if liver tests do not continue to trend down. Otherwise, outpatient GI follow-up with Dr. Quinn Simpson regarding IPMN surveillance.     Dr. Moises Quinonez

## 2023-04-21 NOTE — PROGRESS NOTES
Physician Progress Note      PATIENT:               Fish Conklin  CSN #:                  [id number]  :                       1960  ADMIT DATE:       2023 5:40 PM  [address] [place] DATE:  RESPONDING  PROVIDER #:        Henrietta Farias NP          QUERY TEXT:    Dear Attending,    Pt admitted with chest pain. If possible, please document in progress notes and discharge summary if you are evaluating and/or treating any of the following: The medical record reflects the following:  Risk Factors: pmhx HTN    Clinical Indicators: chest pain is midsternal in location with radiation to the back, constant dull ache, and 10/10 in severity, per Cardiology consult  \"HS troponin normal 4-5-3. EKG SB with no ischemic changes. Suspect GI issues likely involved in symptoms. \" CT scan showed no acute process in the abdomen. MRI showed trace fluid around the extrahepatic bile ducts consideration for cholangitis. Treatment: GI consulted, Cardiology consulted, IVFs, Pepcid, Protonix, IV Flagyl, IV levaquin    Please call if you have any questions or need assistance. I can also be reached via [facility] or [facility] # [phone]. Thank you,  Betsy Christy, [facility]  O: [phone]  Options provided:  -- Chest pain due to CAD with unstable angina  -- Chest pain due to GERD  -- Chest pain due to ##please specify, please specify. -- Other - I will add my own diagnosis  -- Disagree - Not applicable / Not valid  -- Disagree - Clinically unable to determine / Unknown  -- Refer to Clinical Documentation Reviewer    PROVIDER RESPONSE TEXT:    Provider disagreed with this query. Unsure of cause of pain - first described at chest but likely epigastric    Query created by:  Diane Durant on 2023 3:32 PM      Electronically signed by:  Henrietta Farias NP 2023 4:08 PM

## 2023-04-21 NOTE — PROGRESS NOTES
Hospitalist Progress Note  Sharad Osborne NP          Date of Service:  2023  NAME:  Travis Lee  :  1960  MRN:  [id number]    Admission Summary:     Ms. Mitra Mclean is a 50-year-old woman with past medical history significant for obstructive sleep apnea, hypertension, prediabetes, migraine headache, and diverticulitis presented at the [facility] ED at [facility] with chest pain. The chest pain is midsternal in location with radiation to the back, constant dull ache, and 10/10 in severity. No known aggravating or relieving factors. The patient stated that she had a stress test done about 2 years ago that was negative. CT scan of the abdomen and pelvis was negative. but she was found to have elevated liver enzymes (has had cholecystectomy). Lipase is within normal limits. The first set of troponin was negative. Interval history / Subjective:        Pt was seen on rounds today - feeling better today, abdomen less sore and tolerating clear liquids    Assessment & Plan:     Chest/epigastric pain  - Troponins are unremarkable  - Cardiology has been consulted, she has a history of chest pain with a negative stress in the past. No indication for further cardiac testing - seen by cardiology team   - D-dimer mildly elevated (1.05) - pain is controlled, no tachycardia and no SOB/hypoxia  - UDS was negative  - Discontinue telemetry    Abnormal liver function tests. - previous cholecystectomy  - CT scan of the abdomen was negative for acute process  - MRCP of the abdomen showed trace fluid around the extrahepatic bile ducts and proximal duodenum. Possible cholangitis.  Pt on short course of flagyl and Levaquin  - Gastroenterology has seen, advance diet as tolerated - Fulls this evening  - Hepatitis panel negative  - enzymes better today    Obstructive sleep apnea  - does not want to be placed on CPAP    Hypertension  - Blood pressure mildly elevated, continue with lisinopril  - BP better today - appears she runs moriah  - D/C tele    Hypokalemia, mild  - Cardiology ordered replacement 4/20  - resolved    Prediabetes  - hold Glucophage.    - hemoglobin A1c 5.6  - glucose 99 on BMP this am    Migraine headache.  - continue with preadmission medication  - May have ibuprofen as she takes this alongside her sumatriptan      Code status: Full  Prophylaxis: lovenox  Care Plan discussed with: Patient, GI, Dr. Corine Connolly  Anticipated Disposition: home when able, 24-48 hours     Hospital Problems  Date Reviewed: 4/20/2023            Codes Class Noted POA    * (Principal) Chest pain ICD-10-CM: R07.9  ICD-9-CM: 786.50  4/20/2023 Yes        Transaminitis ICD-10-CM: R74.01  ICD-9-CM: 790.4  4/19/2023 Unknown         Review of Systems:     Denies headaches, dizziness  No chest pain or pressure  No shortness of breath or cough  GI \"soreness\" but better today, voiding freely, + BM  Has been OOB    Vital Signs:    Last 24hrs VS reviewed since prior progress note. Most recent are:  Visit Vitals  /79   Pulse (!) 51   Temp 97.8 °F (36.6 °C)   Resp 18   Ht 5' 10\" (1.778 m)   Wt 96.7 kg (213 lb 3 oz)   SpO2 95%   BMI 30.59 kg/m²     No intake or output data in the 24 hours ending 04/21/23 0716     Physical Examination:     I had a face to face encounter with this patient and independently examined them on 4/21/2023 as outlined below:        General :  female sitting up in bed in no acute distress. Cooperative and interactive with assessment  HEENT:  EOMI, moist mucus membranes  Neck: Trachea midline  Chest: No respiratory complaints, no increased WOB  CVS: RRR, heart rate 67. SR on telemetry  Abd: soft/ mildly tender, non distended, BS active  Ext: No edema. Moves all extremities.   Neuro/Psych: pleasant mood and affect,  sensory grossly within normal limit, Strength 5/5 in all extremities  Skin: warm      Data Review:   Review and/or order of clinical lab test  Review and/or order of tests in the medicine section of CPT    I have independently reviewed and interpreted patient's lab and all other diagnostic data    Notes reviewed from all clinical/nonclinical/nursing services involved in patient's clinical care. Care coordination discussions were held with appropriate clinical/nonclinical/ nursing providers based on care coordination needs. Labs:     Recent Labs     04/21/23 0207 04/20/23 0525   WBC 3.4* 3.5*   HGB 12.6 12.8   HCT 39.8 39.9   * 159       Recent Labs     04/21/23 0207 04/20/23  0525 04/19/23  1757    138 138   K 3.8 3.4* 3.9    108 102   CO2 26 26 25   BUN 6 8 11   CREA 0.77 0.73 0.81   GLU 99 96 117*   CA 8.8 9.2 9.0   MG  --  2.1  --    PHOS  --  3.1  --        Recent Labs     04/21/23 0207 04/20/23  0525 04/19/23  1757   * 581* 545*   * 165* 157*   TBILI 1.1* 3.2* 1.7*   TP 5.9* 6.2* 6.8   ALB 2.9* 3.0* 3.5   GLOB 3.0 3.2 3.3   LPSE  --   --  171       No results for input(s): INR, PTP, APTT, INREXT, INREXT in the last 72 hours. No results for input(s): FE, TIBC, PSAT, FERR in the last 72 hours. No results found for: FOL, RBCF   No results for input(s): PH, PCO2, PO2 in the last 72 hours. No results for input(s): CPK, CKNDX, TROIQ in the last 72 hours.     No lab exists for component: CPKMB  Lab Results   Component Value Date/Time    Cholesterol, total 164 04/20/2023 05:25 AM    HDL Cholesterol 59 04/20/2023 05:25 AM    LDL, calculated 88.4 04/20/2023 05:25 AM    Triglyceride 83 04/20/2023 05:25 AM    CHOL/HDL Ratio 2.8 04/20/2023 05:25 AM     No results found for: GLUCPOC  Lab Results   Component Value Date/Time    Color DARK YELLOW 04/20/2023 07:00 AM    Appearance CLOUDY (A) 04/20/2023 07:00 AM    Specific gravity 1.022 04/20/2023 07:00 AM    pH (UA) 7.0 04/20/2023 07:00 AM    Protein Negative 04/20/2023 07:00 AM    Glucose Negative 04/20/2023 07:00 AM Ketone Negative 04/20/2023 07:00 AM    Urobilinogen 2.0 (H) 04/20/2023 07:00 AM    Nitrites Negative 04/20/2023 07:00 AM    Leukocyte Esterase TRACE (A) 04/20/2023 07:00 AM    Epithelial cells FEW 04/20/2023 07:00 AM    Bacteria Negative 04/20/2023 07:00 AM    WBC 0-4 04/20/2023 07:00 AM    RBC 5-10 04/20/2023 07:00 AM     Medications Reviewed:     Current Facility-Administered Medications   Medication Dose Route Frequency    fentaNYL citrate (PF) injection 50 mcg  50 mcg IntraVENous Q4H PRN    lactated Ringers infusion  100 mL/hr IntraVENous CONTINUOUS    famotidine (PEPCID) tablet 20 mg  20 mg Oral BID PRN    lisinopriL (PRINIVIL, ZESTRIL) tablet 10 mg  10 mg Oral DAILY    SUMAtriptan (IMITREX) tablet 50 mg  50 mg Oral Q8H PRN    sodium chloride (NS) flush 5-40 mL  5-40 mL IntraVENous Q8H    sodium chloride (NS) flush 5-40 mL  5-40 mL IntraVENous PRN    polyethylene glycol (MIRALAX) packet 17 g  17 g Oral DAILY PRN    ondansetron (ZOFRAN ODT) tablet 4 mg  4 mg Oral Q8H PRN    Or    ondansetron (ZOFRAN) injection 4 mg  4 mg IntraVENous Q6H PRN    enoxaparin (LOVENOX) injection 40 mg  40 mg SubCUTAneous DAILY    ibuprofen (MOTRIN) tablet 400 mg  400 mg Oral Q6H PRN    levoFLOXacin (LEVAQUIN) 750 mg in D5W IVPB  750 mg IntraVENous Q24H    metroNIDAZOLE (FLAGYL) IVPB premix 500 mg  500 mg IntraVENous Q12H   ______________________________________________________________________  EXPECTED LENGTH OF STAY: 1d 16h  ACTUAL LENGTH OF STAY:          2               Cr Soto NP

## 2023-04-22 VITALS
DIASTOLIC BLOOD PRESSURE: 71 MMHG | TEMPERATURE: 97.7 F | WEIGHT: 213.19 LBS | SYSTOLIC BLOOD PRESSURE: 118 MMHG | BODY MASS INDEX: 30.52 KG/M2 | OXYGEN SATURATION: 96 % | HEART RATE: 48 BPM | RESPIRATION RATE: 18 BRPM | HEIGHT: 70 IN

## 2023-04-22 PROBLEM — R07.9 CHEST PAIN: Status: RESOLVED | Noted: 2023-04-20 | Resolved: 2023-04-22

## 2023-04-22 LAB
ALBUMIN SERPL-MCNC: 2.8 G/DL (ref 3.5–5)
ALBUMIN/GLOB SERPL: 0.9 (ref 1.1–2.2)
ALP SERPL-CCNC: 142 U/L (ref 45–117)
ALT SERPL-CCNC: 336 U/L (ref 12–78)
ANION GAP SERPL CALC-SCNC: 6 MMOL/L (ref 5–15)
AST SERPL-CCNC: 100 U/L (ref 15–37)
ATRIAL RATE: 54 BPM
BILIRUB SERPL-MCNC: 0.7 MG/DL (ref 0.2–1)
BUN SERPL-MCNC: 5 MG/DL (ref 6–20)
BUN/CREAT SERPL: 7 (ref 12–20)
CALCIUM SERPL-MCNC: 8.9 MG/DL (ref 8.5–10.1)
CALCULATED P AXIS, ECG09: 39 DEGREES
CALCULATED R AXIS, ECG10: 7 DEGREES
CALCULATED T AXIS, ECG11: 27 DEGREES
CHLORIDE SERPL-SCNC: 109 MMOL/L (ref 97–108)
CO2 SERPL-SCNC: 25 MMOL/L (ref 21–32)
CREAT SERPL-MCNC: 0.71 MG/DL (ref 0.55–1.02)
DIAGNOSIS, 93000: NORMAL
GLOBULIN SER CALC-MCNC: 3.1 G/DL (ref 2–4)
GLUCOSE SERPL-MCNC: 90 MG/DL (ref 65–100)
P-R INTERVAL, ECG05: 166 MS
POTASSIUM SERPL-SCNC: 3.4 MMOL/L (ref 3.5–5.1)
PROT SERPL-MCNC: 5.9 G/DL (ref 6.4–8.2)
Q-T INTERVAL, ECG07: 436 MS
QRS DURATION, ECG06: 86 MS
QTC CALCULATION (BEZET), ECG08: 413 MS
SODIUM SERPL-SCNC: 140 MMOL/L (ref 136–145)
VENTRICULAR RATE, ECG03: 54 BPM

## 2023-04-22 PROCEDURE — 74011250637 HC RX REV CODE- 250/637: Performed by: INTERNAL MEDICINE

## 2023-04-22 PROCEDURE — 80053 COMPREHEN METABOLIC PANEL: CPT

## 2023-04-22 PROCEDURE — 74011000250 HC RX REV CODE- 250: Performed by: INTERNAL MEDICINE

## 2023-04-22 PROCEDURE — 74011250637 HC RX REV CODE- 250/637: Performed by: NURSE PRACTITIONER

## 2023-04-22 PROCEDURE — 74011250636 HC RX REV CODE- 250/636: Performed by: PHYSICIAN ASSISTANT

## 2023-04-22 PROCEDURE — 36415 COLL VENOUS BLD VENIPUNCTURE: CPT

## 2023-04-22 RX ORDER — METRONIDAZOLE 500 MG/1
500 TABLET ORAL 2 TIMES DAILY
Qty: 11 TABLET | Refills: 0 | Status: SHIPPED | OUTPATIENT
Start: 2023-04-22 | End: 2023-04-28

## 2023-04-22 RX ORDER — FLUCONAZOLE 150 MG/1
150 TABLET ORAL DAILY
Qty: 1 TABLET | Refills: 0 | Status: SHIPPED | OUTPATIENT
Start: 2023-04-22 | End: 2023-04-23

## 2023-04-22 RX ORDER — POTASSIUM CHLORIDE 750 MG/1
40 TABLET, FILM COATED, EXTENDED RELEASE ORAL ONCE
Status: COMPLETED | OUTPATIENT
Start: 2023-04-22 | End: 2023-04-22

## 2023-04-22 RX ORDER — LEVOFLOXACIN 750 MG/1
750 TABLET ORAL DAILY
Qty: 4 TABLET | Refills: 0 | Status: SHIPPED | OUTPATIENT
Start: 2023-04-23

## 2023-04-22 RX ADMIN — SODIUM CHLORIDE, PRESERVATIVE FREE 10 ML: 5 INJECTION INTRAVENOUS at 05:45

## 2023-04-22 RX ADMIN — METRONIDAZOLE 500 MG: 500 INJECTION, SOLUTION INTRAVENOUS at 05:44

## 2023-04-22 RX ADMIN — LEVOFLOXACIN 750 MG: 5 INJECTION, SOLUTION INTRAVENOUS at 13:06

## 2023-04-22 RX ADMIN — LISINOPRIL 10 MG: 10 TABLET ORAL at 09:00

## 2023-04-22 RX ADMIN — POTASSIUM CHLORIDE 40 MEQ: 750 TABLET, FILM COATED, EXTENDED RELEASE ORAL at 08:59

## 2023-04-22 NOTE — PROGRESS NOTES
Problem: Falls - Risk of  Goal: *Absence of Falls  Description: Document Haily Littler Fall Risk and appropriate interventions in the flowsheet. Outcome: Progressing Towards Goal  Note: Fall Risk Interventions:     Call bell within reach. Bed wheels locked. Gripper socks to bilateral feet. Bed Alarm on.

## 2023-04-22 NOTE — PROGRESS NOTES
[address] April 22, 2023       RE: Lupe Oviedo      To Whom It May Concern,    This is to certify that Lupe Oviedo was hospitalized from 4/19/2023-4/22/2023 and may return to work on 4/25/2023 without limitations. Please feel free to contact my office if you have any questions or concerns. Thank you for your assistance in this matter.       Sincerely,  Dorinda Marin NP

## 2023-04-22 NOTE — PROGRESS NOTES
Report given to day shift Nurse Rayo Hargrove 435. Report included SBAR, MAR, KARDEX, LAB RESULTS, INTAKE/OUTPUT.

## 2023-04-22 NOTE — PROGRESS NOTES
Written and verbal instructions provided for pt. Pt aware to  prescriptions from pharmacy. Pt aware to follow up with GI MD for lab work. Pt has all personal belongings at time of discharge. No questions or concerns voiced.

## 2023-04-22 NOTE — DISCHARGE INSTRUCTIONS
Discharge Instructions     PATIENT ID: Walter Gonzalez  MRN: [id number]   YOB: 1960    DATE OF ADMISSION: 4/19/2023   DATE OF DISCHARGE: 4/22/2023  PRIMARY CARE PROVIDER: Rosalind Franklin   ATTENDING PHYSICIAN: Td Rothman MD  DISCHARGING PROVIDER: Raghav Jeffrey NP      DISCHARGE DIAGNOSES   Elevated Liver Enzymes    CONSULTATIONS:   Gastroenterology    PROCEDURES/SURGERIES: * No surgery found *    PENDING TEST RESULTS:   At the time of discharge the following test results are still pending: none    FOLLOW UP APPOINTMENTS:   Follow-up Information       Follow up With Specialties Details Why Contact Info    Brittanie Warren MD Gastroenterology Schedule an appointment as soon as possible for a visit for follow up and lab monitoring [address] [place] [address]      Rosalind Franklin MD Family Medicine Follow up As needed [address] [facility] [address] Ishmael Siu [address] [place], [place] [id number]  [phone]            ADDITIONAL CARE RECOMMENDATIONS:   Take complete course of antibiotics  Start Levaquin tomorrow and take x 4 more days  Start Flagyl this evening and take for 11 more doses    1 dose of Diflucan has been sent to pharmacy in case of yeast infection    Hepatic Enzymes:   ->476->336 (4/22)  ->279->100 (4/22)  Alk Phos  165 ->168 ->142 (4/22)    Follow up with GI for repeat lab testing to continue to monitor liver function    HOLD metformin for now due to elevated liver enzymes - can resume once these normalize and okay with GI     DIET: Low Fat    ACTIVITY: Activity as tolerated    EQUIPMENT needed: none    DISCHARGE MEDICATIONS:   See Medication Reconciliation Form    It is important that you take the medication exactly as they are prescribed. Keep your medication in the bottles provided by the pharmacist and keep a list of the medication names, dosages, and times to be taken in your wallet.    Do not take other medications without consulting your doctor. NOTIFY YOUR PHYSICIAN FOR ANY OF THE FOLLOWING:   Fever over 101 degrees for 24 hours. Chest pain, shortness of breath, fever, chills, nausea, vomiting, diarrhea, change in mentation, falling, weakness, bleeding. Severe pain or pain not relieved by medications. Or, any other signs or symptoms that you may have questions about.     DISPOSITION:    Home With:   OT  PT  HH  RN       SNF/Inpatient Rehab/LTAC    Independent/assisted living    Hospice   xx Other: Home     CDMP Checked:   Yes xx     PROBLEM LIST Updated:  Yes xx     Signed:   Zak Estrella NP  4/22/2023  9:57 AM

## 2023-04-22 NOTE — DISCHARGE SUMMARY
Discharge Summary     PATIENT ID: Vazquez Sorensen  MRN: [id number]   YOB: 1960    DATE OF ADMISSION: 4/19/2023  5:40 PM    DATE OF DISCHARGE: 4/22/2023   PRIMARY CARE PROVIDER: Marylene Ear, MD   ATTENDING PHYSICIAN: Bonnie Cowart MD  DISCHARGING PROVIDER: Aamir Cazares NP      CONSULTATIONS: IP CONSULT TO CARDIOLOGY  IP CONSULT TO CARDIOLOGY  IP CONSULT TO GASTROENTEROLOGY    PROCEDURES/SURGERIES: * No surgery found *    [address] [facility],[address] COURSE:     Ms. Celi Foley is a 36-year-old woman with past medical history significant for obstructive sleep apnea, hypertension, prediabetes, migraine headache, and diverticulitis presented at the [facility] ED at [place] with chest pain. The chest pain is midsternal in location with radiation to the back, constant dull ache, and 10/10 in severity. No known aggravating or relieving factors. The patient stated that she had a stress test done about 2 years ago that was negative. CT scan of the abdomen and pelvis was negative. but she was found to have elevated liver enzymes (has had cholecystectomy). Lipase is within normal limits. The first set of troponin was negative    DISCHARGE DIAGNOSES / PLAN:      Epigastric pain (less likely chest)  - Troponins are unremarkable  - Cardiology has been consulted, she has a history of chest pain with a negative stress in the past. No indication for further cardiac testing - seen by cardiology team 4/20  - D-dimer mildly elevated (1.05) - pain is controlled, no tachycardia and no SOB/hypoxia  - UDS was negative  - Resolved now     Abnormal liver function tests. - previous cholecystectomy  - CT scan of the abdomen was negative for acute process  - MRCP of the abdomen showed trace fluid around the extrahepatic bile ducts and proximal duodenum. Possible cholangitis.  Pt on short course of flagyl and Levaquin  - Gastroenterology has seen and signed off,  pt to follow up with  [facility] outpatient  - Hepatitis panel negative  - enzymes better today - recommend following up outpatient for repeat lab testing in ~ 1 week     Obstructive sleep apnea  - does not want to be placed on CPAP     Hypertension  - Blood pressure mildly elevated, continue with lisinopril  - BP better today - appears she runs moriah  - D/C tele     Hypokalemia, mild  - replaced once again today prior to discharge     Prediabetes  - hold Glucophage on discharge, resume once lvier enzymes normalized and ok with GI  - hemoglobin A1c 5.6  - glucose 90 on BMP this am     Migraine headache.  - continue with preadmission medication  - May have ibuprofen as she takes this alongside her sumatriptan    BMI: Body mass index is 30.59 kg/m². . This patient: Meets criteria for obesity given BMI >/= 30 and < 40 due to excess calories/nutritional. Weight loss and lifestyle modifications should be encouraged as an outpatient. PENDING TEST RESULTS:   At the time of discharge the following test results are still pending: none     ADDITIONAL CARE RECOMMENDATIONS:   Take complete course of antibiotics  Start Levaquin tomorrow and take x 4 more days  Start Flagyl this evening and take for 11 more doses    1 dose of Diflucan has been sent to pharmacy in case of yeast infection    Hepatic Enzymes:   ->476->336 (4/22)  ->279->100 (4/22)  Alk Phos  165 ->168 ->142 (4/22)    Follow up with GI for repeat lab testing to continue to monitor liver function    HOLD metformin for now due to elevated liver enzymes - can resume once these normalize and okay with GI     DIET: Low Fat    ACTIVITY: Activity as tolerated    EQUIPMENT needed: none    NOTIFY YOUR PHYSICIAN FOR ANY OF THE FOLLOWING:   Fever over 101 degrees for 24 hours. Chest pain, shortness of breath, fever, chills, nausea, vomiting, diarrhea, change in mentation, falling, weakness, bleeding.  Severe pain or pain not relieved by medications, as well as any other signs or symptoms that you may have questions about. FOLLOW UP APPOINTMENTS:    Follow-up Information       Follow up With Specialties Details Why Contact Info    Dara Sorenson MD Gastroenterology Schedule an appointment as soon as possible for a visit for follow up and lab monitoring [address] [facility] [address] [place] [address]      Primitivo Jiménez MD Family Medicine Follow up As needed [address] [facility] [address] Rojas [address] [address] [phone]             DISCHARGE MEDICATIONS:  Current Discharge Medication List        START taking these medications    Details   levoFLOXacin (Levaquin) 750 mg tablet Take 1 Tablet by mouth daily. Start 4/23/2023  Qty: 4 Tablet, Refills: 0  Start date: 4/23/2023      metroNIDAZOLE (FlagyL) 500 mg tablet Take 1 Tablet by mouth two (2) times a day for 11 doses. Start evening 4/22/2023  Qty: 11 Tablet, Refills: 0  Start date: 4/22/2023, End date: 4/28/2023      fluconazole (Diflucan) 150 mg tablet Take 1 Tablet by mouth daily for 1 dose. FDA advises cautious prescribing of oral fluconazole in pregnancy. Take if needed for Yeast infection. Qty: 1 Tablet, Refills: 0  Start date: 4/22/2023, End date: 4/23/2023           CONTINUE these medications which have NOT CHANGED    Details   famotidine (Pepcid) 20 mg tablet Take 1 Tablet by mouth two (2) times daily as needed for Heartburn. Qty: 20 Tablet, Refills: 0  Start date: 4/19/2023      fluticasone (Flonase Sensimist) 27.5 mcg/actuation nasal spray 1 spray in each nostril Nasally Once a day for 30 day(s)      lisinopriL (PRINIVIL, ZESTRIL) 10 mg tablet Take 1 Tablet by mouth daily. raloxifene (EVISTA) 60 mg tablet raloxifene 60 mg tablet   TAKE 1 TABLET BY MOUTH EVERY DAY      SUMAtriptan (IMITREX) 100 mg tablet sumatriptan 100 mg tablet      multivitamin (ONE A DAY) tablet Take 1 Tablet by mouth daily. fexofenadine (ALLEGRA) 180 mg tablet Take 180 mg by mouth daily.            STOP taking these medications       metFORMIN ER (GLUCOPHAGE XR) 500 mg tablet Comments:   Reason for Stopping:             DISPOSITION:    Home With:   OT  PT  HH  RN       Long term SNF/Inpatient Rehab    Independent/assisted living    Hospice   xx Other: None     PATIENT CONDITION AT DISCHARGE:   Functional status    Poor     Deconditioned    xx Independent      Cognition   xx  Lucid     Forgetful     Dementia      Catheters/lines (plus indication)    Gomez     PICC     PEG    xx None      Code status   xx  Full code     DNR      PHYSICAL EXAMINATION AT DISCHARGE:    Patient was seen and examined, she was sitting up in bed in no acute distress, cooperative and interactive with assessment. She denies any headaches, dizziness, chest pain, chest pressure, shortness of breath or cough. Denies any GI complaints such as nausea, vomiting, diarrhea (though stool is loose), constipation or abdominal pain. She has been tolerating an oral diet, had solids this morning. Discussed discharge plans for later on this afternoon, will need to complete course of antibiotics and follow-up with her GI physician outpatient. Medication sent to her preferred pharmacy. General :  female sitting up in bed in no acute distress. Cooperative and interactive with assessment  HEENT:  EOMI, moist mucus membranes  Neck: Trachea midline  Chest: Lungs clear to auscultation, sats 98% on room air  CVS: RRR, heart rate 53. Abd: soft/ nontender, non distended, BS active.  + BM today  Ext: No edema. Moves all extremities.   Neuro/Psych: pleasant mood and affect,  sensory grossly within normal limit, Strength 5/5 in all extremities  Skin: warm    CHRONIC MEDICAL DIAGNOSES:  Problem List as of 4/22/2023 Date Reviewed: 4/22/2023            Codes Class Noted - Resolved    Transaminitis ICD-10-CM: R74.01  ICD-9-CM: 790.4  4/19/2023 - Present        Diverticulitis ICD-10-CM: K57.92  ICD-9-CM: 562.11  Unknown - Present        Sleep apnea ICD-10-CM: G47.30  ICD-9-CM: 780.57  Unknown - Present        Sciatica of left side ICD-10-CM: M54.32  ICD-9-CM: 724.3  5/13/2013 - Present    Overview Signed 5/13/2013  2:18 PM by Lobito Varma MD     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

## 2023-04-22 NOTE — PROGRESS NOTES
Bedside and Verbal shift change report given to [address] [place] [address]  (oncoming nurse) by Nikolai Barragan  (offgoing nurse). Report included the following information SBAR.

## 2023-05-10 RX ORDER — LEVOFLOXACIN 750 MG/1
TABLET ORAL DAILY
COMMUNITY
Start: 2023-04-23

## 2023-05-10 RX ORDER — RALOXIFENE HYDROCHLORIDE 60 MG/1
TABLET, FILM COATED ORAL
COMMUNITY
Start: 2022-10-26

## 2023-05-10 RX ORDER — SUMATRIPTAN 100 MG/1
TABLET, FILM COATED ORAL
COMMUNITY
Start: 2000-11-01

## 2023-05-10 RX ORDER — LISINOPRIL 10 MG/1
1 TABLET ORAL DAILY
COMMUNITY
Start: 2023-02-04

## 2023-05-10 RX ORDER — FEXOFENADINE HCL 180 MG/1
180 TABLET ORAL DAILY
COMMUNITY

## 2023-05-10 RX ORDER — FAMOTIDINE 20 MG/1
TABLET, FILM COATED ORAL 2 TIMES DAILY PRN
COMMUNITY
Start: 2023-04-19

## 2023-05-10 RX ORDER — FLUTICASONE FUROATE 27.5 UG/1
SPRAY, METERED NASAL
COMMUNITY
Start: 2015-11-01

## 2024-01-30 ENCOUNTER — OFFICE VISIT (OUTPATIENT)
Age: 64
End: 2024-01-30
Payer: COMMERCIAL

## 2024-01-30 VITALS
SYSTOLIC BLOOD PRESSURE: 120 MMHG | HEIGHT: 70 IN | RESPIRATION RATE: 17 BRPM | WEIGHT: 221 LBS | BODY MASS INDEX: 31.64 KG/M2 | OXYGEN SATURATION: 95 % | HEART RATE: 88 BPM | TEMPERATURE: 97.7 F | DIASTOLIC BLOOD PRESSURE: 83 MMHG

## 2024-01-30 DIAGNOSIS — D49.0 IPMN (INTRADUCTAL PAPILLARY MUCINOUS NEOPLASM): Primary | ICD-10-CM

## 2024-01-30 PROCEDURE — 99204 OFFICE O/P NEW MOD 45 MIN: CPT | Performed by: SURGERY

## 2024-01-30 RX ORDER — BUPROPION HYDROCHLORIDE 300 MG/1
TABLET ORAL
COMMUNITY
Start: 2023-09-19

## 2024-01-30 RX ORDER — METFORMIN HYDROCHLORIDE 500 MG/1
2 TABLET, EXTENDED RELEASE ORAL 2 TIMES DAILY
COMMUNITY
Start: 2021-11-21

## 2024-01-30 ASSESSMENT — PATIENT HEALTH QUESTIONNAIRE - PHQ9
2. FEELING DOWN, DEPRESSED OR HOPELESS: 0
SUM OF ALL RESPONSES TO PHQ QUESTIONS 1-9: 0
SUM OF ALL RESPONSES TO PHQ QUESTIONS 1-9: 0
1. LITTLE INTEREST OR PLEASURE IN DOING THINGS: 0
SUM OF ALL RESPONSES TO PHQ QUESTIONS 1-9: 0
SUM OF ALL RESPONSES TO PHQ QUESTIONS 1-9: 0
SUM OF ALL RESPONSES TO PHQ9 QUESTIONS 1 & 2: 0

## 2024-01-30 NOTE — PROGRESS NOTES
Identified pt with two pt identifiers (name and ). Reviewed chart in preparation for visit and have obtained necessary documentation.    Juany Aponte is a 63 y.o. female  No chief complaint on file.    /83 (Site: Left Upper Arm, Position: Sitting, Cuff Size: Large Adult)   Pulse 88   Temp 97.7 °F (36.5 °C) (Oral)   Resp 17   Ht 1.778 m (5' 10\")   Wt 100.2 kg (221 lb)   SpO2 95%   BMI 31.71 kg/m²     1. Have you been to the ER, urgent care clinic since your last visit?  Hospitalized since your last visit?no    2. Have you seen or consulted any other health care providers outside of the [facility] since your last visit?  Include any pap smears or colon screening. no

## 2024-01-30 NOTE — PROGRESS NOTES
Attending Addendum:   I have independently examined the patient and have reviewed the chart.  I agree with the assessment and plan below.  The patient was kindly referred by Dr. 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  These were discovered incidentally on MRI performed in April 2023 when she was admitted for possible cholangitis and a passed gallstone.  The patient denies any history of pancreatitis or new onset diabetes but she is a prediabetic.  The cystic lesions were all less than 1 cm and appeared consistent with sidebranch IPMN's.  There were no high risk stigmata or worrisome features present.  The patient denies any abdominal pain, weight loss or recurrent jaundice episodes like she had in April.  I recommended a follow-up MRI to assure stability of the lesions and she would like to schedule this for April due to insurance reasons.  I think that is reasonable.  If these are stable, these can likely be followed with either annual or every 2-year MRIs/CTs going forward.  We discussed the small risk for developing pancreatic adenocarcinoma in patients with IPMN's however surveillance is recommended for low risk lesions given the morbidity of pancreatic surgery.  I will call her after the MRI results.      Dragan Laboy MD        [facility] History and Physical    Chief Complaint: history of IPMN    History of Present Illness:      Juany Aponte is a 63 y.o. female who underwent a cholecystectomy in December of 2022. She then presented to the hospital in April of 2023 with epigastric pain and elevated LFTs. She had an MRI (non-contrast) performed at that time and was noted to have several small (<1cm) cystic lesions of her pancreas. Her abdominal pain and elevated LFTs were thought to be due to a passing retained gallstone versus peptic ulcer and she was discharged from the ER. She did not require any interventions. An

## 2024-03-11 ENCOUNTER — TELEPHONE (OUTPATIENT)
Age: 64
End: 2024-03-11

## 2024-03-11 NOTE — TELEPHONE ENCOUNTER
Called patient, 2 pt identifiers to remind patient to schedule MRI.  Gave pt number to central scheduling.      Pt to have MRI in April.

## 2024-04-16 ENCOUNTER — HOSPITAL ENCOUNTER (OUTPATIENT)
Facility: HOSPITAL | Age: 64
Discharge: HOME | End: 2024-04-19
Attending: SURGERY
Payer: COMMERCIAL

## 2024-04-16 DIAGNOSIS — D49.0 IPMN (INTRADUCTAL PAPILLARY MUCINOUS NEOPLASM): ICD-10-CM

## 2024-04-16 PROCEDURE — 74181 MRI ABDOMEN W/O CONTRAST: CPT

## 2024-04-23 ENCOUNTER — TELEPHONE (OUTPATIENT)
Age: 64
End: 2024-04-23

## 2024-04-23 DIAGNOSIS — D49.0 IPMN (INTRADUCTAL PAPILLARY MUCINOUS NEOPLASM): Primary | ICD-10-CM

## 2024-04-23 NOTE — TELEPHONE ENCOUNTER
Leona  called from authorizations regarding a peer to peer for the MRI. She provided phone to contact insurance [phone]. Case # [id number]

## 2024-04-23 NOTE — TELEPHONE ENCOUNTER
Returned call to Lee to schedule peer to peer.  Spoke with Machelle GONZALEZ She asked if [facility] was a [facility] of Excellence, informed her the hospital has that title.  She then confirmed she will note this in records and this will approve the facility for MRI completed.  Auth # [id number] 4/15/2024-10/12/2024 this is for both the facility and the test.     Central scheduling informed of approval given authorization number to document in chart.

## 2024-04-23 NOTE — TELEPHONE ENCOUNTER
Called [facility] to discuss recent denial for MRI completed 4/18/2024.  Spoke with Geno  confirmed CPT code for MRI was approved, but the facility was denied.  Transferred to physician support dept spoke with Sylvia petty level of review for this case would be a reconsideration.  This can be processed with letter stating reasoning of site of care or peer to peer can be scheduled with provider to discuss.  She can schedule now.  Informed her I will send message to ordering to provider to confirm date and time that is convenient for him to complete.  Informed we can call them back directly at [phone] option 4 to schedule.

## 2025-04-17 ENCOUNTER — TELEPHONE (OUTPATIENT)
Age: 65
End: 2025-04-17

## 2025-04-17 NOTE — TELEPHONE ENCOUNTER
Patient called stating that she thought Dr. Laboy wanted her to have a scan done this year but she has not gotten a call to schedule it yet and just wanted to follow up.

## 2025-05-02 ENCOUNTER — HOSPITAL ENCOUNTER (OUTPATIENT)
Facility: HOSPITAL | Age: 65
Discharge: HOME | End: 2025-05-02
Attending: SURGERY
Payer: COMMERCIAL

## 2025-05-02 DIAGNOSIS — D49.0 IPMN (INTRADUCTAL PAPILLARY MUCINOUS NEOPLASM): ICD-10-CM

## 2025-05-02 PROCEDURE — 74170 CT ABD WO CNTRST FLWD CNTRST: CPT

## 2025-05-02 PROCEDURE — 6360000004 HC RX CONTRAST MEDICATION: Performed by: SURGERY

## 2025-05-02 RX ORDER — IOPAMIDOL 755 MG/ML
100 INJECTION, SOLUTION INTRAVASCULAR
Status: COMPLETED | OUTPATIENT
Start: 2025-05-02 | End: 2025-05-02

## 2025-05-02 RX ADMIN — IOPAMIDOL 100 ML: 755 INJECTION, SOLUTION INTRAVENOUS at 12:32

## 2025-05-05 RX ORDER — IOPAMIDOL 612 MG/ML
INJECTION, SOLUTION INTRAVASCULAR
Status: DISPENSED
Start: 2025-05-05 | End: 2025-05-05

## 2025-05-20 ENCOUNTER — TELEPHONE (OUTPATIENT)
Age: 65
End: 2025-05-20

## 2025-05-20 NOTE — TELEPHONE ENCOUNTER
Called patient in attempt to schedule a follow up with Dr. Laboy to review imaging. Patient stated she did not have a calendar in front of her and stated she would give us a call back.

## 2025-05-21 NOTE — TELEPHONE ENCOUNTER
Got a hold of patients spouse and patient was in the background saying she told us she would call us back when she had a calendar in front of her.

## 2025-05-21 NOTE — TELEPHONE ENCOUNTER
Second attempt to call patient to schedule a follow up with Dr. Laboy to review imaging. Patient stated she did not have a calendar in front of her and stated she would give us a call back

## 2025-05-23 NOTE — TELEPHONE ENCOUNTER
Attempted to contact patient to schedule follow up with dr. Laboy to discuss imaging. Left voicemail for return call.

## 2025-06-26 ENCOUNTER — OFFICE VISIT (OUTPATIENT)
Age: 65
End: 2025-06-26
Payer: COMMERCIAL

## 2025-06-26 VITALS
OXYGEN SATURATION: 97 % | WEIGHT: 201.6 LBS | RESPIRATION RATE: 16 BRPM | SYSTOLIC BLOOD PRESSURE: 125 MMHG | HEART RATE: 69 BPM | DIASTOLIC BLOOD PRESSURE: 86 MMHG | TEMPERATURE: 98.1 F | HEIGHT: 70 IN | BODY MASS INDEX: 28.86 KG/M2

## 2025-06-26 DIAGNOSIS — D49.0 IPMN (INTRADUCTAL PAPILLARY MUCINOUS NEOPLASM): Primary | ICD-10-CM

## 2025-06-26 PROCEDURE — 99213 OFFICE O/P EST LOW 20 MIN: CPT | Performed by: SURGERY

## 2025-06-26 RX ORDER — SEMAGLUTIDE 0.68 MG/ML
INJECTION, SOLUTION SUBCUTANEOUS
COMMUNITY
Start: 2024-04-04

## 2025-06-26 ASSESSMENT — PATIENT HEALTH QUESTIONNAIRE - PHQ9
SUM OF ALL RESPONSES TO PHQ QUESTIONS 1-9: 0
2. FEELING DOWN, DEPRESSED OR HOPELESS: NOT AT ALL
1. LITTLE INTEREST OR PLEASURE IN DOING THINGS: NOT AT ALL

## 2025-06-26 NOTE — PROGRESS NOTES
Identified pt with two pt identifiers (name and ). Reviewed chart in preparation for visit and have obtained necessary documentation.    Juany Aponte is a 65 y.o. female  Chief Complaint   Patient presents with    Follow-up     /86 (BP Site: Left Upper Arm, Patient Position: Sitting, BP Cuff Size: Large Adult)   Pulse 69   Temp 98.1 °F (36.7 °C) (Oral)   Resp 16   Ht 1.765 m (5' 9.5\")   Wt 91.4 kg (201 lb 9.6 oz)   SpO2 97%   BMI 29.34 kg/m²     1. Have you been to the ER, urgent care clinic since your last visit?  Hospitalized since your last visit?no    2. Have you seen or consulted any other health care providers outside of the [facility] since your last visit?  Include any pap smears or colon screening. yes - [facility]  
CT dose reduction was  achieved through use of a standardized protocol tailored for this examination  and automatic exposure control for dose modulation.    FINDINGS:    LOWER THORAX: No significant abnormality in the incidentally imaged lower chest.    LIVER: No mass.  BILIARY TREE: Gallbladder is surgically absent. CBD is not dilated.  SPLEEN: Unremarkable.  PANCREAS: No enhancing mass or ductal dilation. No significant change in cystic  lesions of the pancreatic head and neck measuring up to 1.1 cm with no  suspicious enhancing components.  ADRENALS: Unremarkable.  KIDNEYS: No mass, calculus, or hydronephrosis.  STOMACH: Unremarkable.  VISUALIZED BOWEL: No dilatation or wall thickening. Noninflamed appearing  colonic diverticula.  PERITONEUM: No ascites or pneumoperitoneum.  RETROPERITONEUM: No lymphadenopathy or aortic aneurysm.  BONES: Degenerative spine change. No acute fracture or aggressive lesion.  ABDOMINAL WALL: Tiny fat-containing umbilical hernia. No mass or other hernia.  ADDITIONAL COMMENTS: N/A    Impression  No significant change in cystic lesions of the pancreas measuring up  to 1.1 cm likely reflective of sidebranch IPMN lesions. Incidentals as above  including colonic diverticulosis.    Electronically signed by Cricket Weiss     Juany Aponte is a 65 y.o.yr old female seen by me previously for history of small sidebranch IPMN's that were identified incidentally on imaging.  She has several of these measuring approximately 1 cm or less.  No high risk or worrisome features have been noted.  She returns today after surveillance imaging.    Plan     The patient is doing well.  There is no significant change noted in the suspected small sidebranch IPMN's of her pancreas.  I have recommended follow-up imaging in 1 year.  These appear to show a better on MRI so I will order an MRI for these.  I will see her back again in 1 year.  We discussed symptoms that would be of concern such